# Patient Record
Sex: FEMALE | Race: WHITE | NOT HISPANIC OR LATINO | Employment: OTHER | URBAN - METROPOLITAN AREA
[De-identification: names, ages, dates, MRNs, and addresses within clinical notes are randomized per-mention and may not be internally consistent; named-entity substitution may affect disease eponyms.]

---

## 2018-09-05 ENCOUNTER — OFFICE VISIT CONVERTED (OUTPATIENT)
Dept: NEUROSURGERY | Facility: CLINIC | Age: 58
End: 2018-09-05
Attending: PHYSICIAN ASSISTANT

## 2018-09-05 ENCOUNTER — CONVERSION ENCOUNTER (OUTPATIENT)
Dept: NEUROLOGY | Facility: CLINIC | Age: 58
End: 2018-09-05

## 2018-09-11 ENCOUNTER — OFFICE VISIT CONVERTED (OUTPATIENT)
Dept: GASTROENTEROLOGY | Facility: CLINIC | Age: 58
End: 2018-09-11
Attending: PHYSICIAN ASSISTANT

## 2018-09-12 ENCOUNTER — OFFICE VISIT CONVERTED (OUTPATIENT)
Dept: NEUROSURGERY | Facility: CLINIC | Age: 58
End: 2018-09-12
Attending: NEUROLOGICAL SURGERY

## 2018-12-27 ENCOUNTER — CONVERSION ENCOUNTER (OUTPATIENT)
Dept: OTHER | Facility: HOSPITAL | Age: 58
End: 2018-12-27

## 2020-07-16 ENCOUNTER — OFFICE VISIT CONVERTED (OUTPATIENT)
Dept: ORTHOPEDIC SURGERY | Facility: CLINIC | Age: 60
End: 2020-07-16
Attending: ORTHOPAEDIC SURGERY

## 2020-07-31 ENCOUNTER — HOSPITAL ENCOUNTER (OUTPATIENT)
Dept: PREADMISSION TESTING | Facility: HOSPITAL | Age: 60
Discharge: HOME | End: 2020-07-31
Attending: ORTHOPAEDIC SURGERY

## 2020-08-02 LAB — SARS-COV-2 RNA SPEC QL NAA+PROBE: NOT DETECTED

## 2020-08-05 ENCOUNTER — HOSPITAL ENCOUNTER (OUTPATIENT)
Dept: PERIOP | Facility: HOSPITAL | Age: 60
Setting detail: HOSPITAL OUTPATIENT SURGERY
Discharge: HOME | End: 2020-08-05
Attending: ORTHOPAEDIC SURGERY

## 2020-08-05 LAB
GLUCOSE BLD-MCNC: 154 MG/DL (ref 65–99)
GLUCOSE BLD-MCNC: 177 MG/DL (ref 65–99)

## 2020-08-20 ENCOUNTER — OFFICE VISIT CONVERTED (OUTPATIENT)
Dept: ORTHOPEDIC SURGERY | Facility: CLINIC | Age: 60
End: 2020-08-20
Attending: PHYSICIAN ASSISTANT

## 2020-09-17 ENCOUNTER — OFFICE VISIT CONVERTED (OUTPATIENT)
Dept: ORTHOPEDIC SURGERY | Facility: CLINIC | Age: 60
End: 2020-09-17
Attending: PHYSICIAN ASSISTANT

## 2021-01-14 ENCOUNTER — HOSPITAL ENCOUNTER (OUTPATIENT)
Dept: GASTROENTEROLOGY | Facility: HOSPITAL | Age: 61
Discharge: HOME | End: 2021-01-14
Attending: FAMILY MEDICINE

## 2021-05-10 NOTE — H&P
History and Physical      Patient Name: Maggie Guzmán   Patient ID: [id number]   Sex: Female   YOB: 1960    Primary Care Provider: Kemar Marti MD   Referring Provider: Kemar Marti MD    Visit Date: July 16, 2020    Provider: Briseida Higginbotham MD   Location: [facility]   Location Address: [address] [place] [address]  [id number]   Location Phone: [phone]          Chief Complaint  · Right Elbow Pain & Numbness.      History Of Present Illness  Maggie Guzmán is a 59 year old /White female who presents today for right elbow numbness that comes down to the 4th and 5th phalanx. Patient states it has been increasingly getting worse over the last month. Patient has tried bracing and conservative treatment, which has failed. She has had an EMG/NCV study at [facility], which is positive for cubital tunnel.       Past Medical History  Anxiety; Arthritis; Degenerative Disc Disease ; Diabetes; Diabetes mellitus, insulin dependent (IDDM), controlled; Leg pain; Lumbago/low back pain; Radiculopathy, lumbosacral; Seasonal allergies; Seizures; Thyroid disorder; Ulcer         Past Surgical History  Appendectomy; Carpal Tunnel Release; Cholecstectomy; Cholecystecomy; Colonoscopy; Cyst Removal; EGD; Gallbladder; Hand surgery; Hernia; Hysterectomy; Joint Surgery; Tonsillectomy; Tubal_Ligation; Wrist Surgery         Medication List  aspirin 81 mg Oral Tablet, Chewable; Humalog 100 unit/mL subcutaneous cartridge; Lipitor 10 mg oral tablet; Protonix 40 mg oral tablet,delayed release (DR/EC)         Allergy List  Codeine Phosphate; Codeine Sulfate; Latex; Latex Exam Gloves; PENICILLINS         Family Medical History  Dementia, Senile; Uterus Neoplasm, Malignant; Stroke; Heart Disease; Cancer, Unspecified; Colon Neoplasm; Hypertension; Multiple sclerosis; Family history of Arthritis; Family history of diabetes mellitus         Social History  Alcohol (Never); Alcohol Use (Never); ; Homemaker; lives  "with children; lives with other; Recreational Drug Use (Never); Single.; Tobacco (Former)         Review of Systems  · Constitutional  o Denies  o : fever, chills, weight loss  · Cardiovascular  o Denies  o : chest pain, shortness of breath  · Gastrointestinal  o Denies  o : liver disease, heartburn, nausea, blood in stools  · Genitourinary  o Denies  o : painful urination, blood in urine  · Integument  o Denies  o : rash, itching  · Neurologic  o Admits  o : elbow numbness  o Denies  o : headache, weakness, loss of consciousness  · Musculoskeletal  o Admits  o : painful, swollen joints  · Psychiatric  o Denies  o : drug/alcohol addiction, anxiety, depression      Vitals  Date Time BP Position Site L\R Cuff Size HR RR TEMP (F) WT  HT  BMI kg/m2 BSA m2 O2 Sat        07/16/2020 02:57 PM      86 - R   218lbs 0oz 5'  7\" 34.14 2.16 96 %          Physical Examination  · Constitutional  o Appearance  o : well developed, well-nourished, no obvious deformities present  · Head and Face  o Head  o :   § Inspection  § : normocephalic  o Face  o :   § Inspection  § : no facial lesions  · Eyes  o Conjunctivae  o : conjunctivae normal  o Sclerae  o : sclerae white  · Ears, Nose, Mouth and Throat  o Ears  o :   § External Ears  § : appearance within normal limits  § Hearing  § : intact  o Nose  o :   § External Nose  § : appearance normal  · Neck  o Inspection/Palpation  o : normal appearance  o Range of Motion  o : full range of motion  · Respiratory  o Respiratory Effort  o : breathing unlabored  o Inspection of Chest  o : normal appearance  o Auscultation of Lungs  o : no audible wheezing or rales  · Cardiovascular  o Heart  o : regular rate  · Gastrointestinal  o Abdominal Examination  o : soft and non-tender  · Skin and Subcutaneous Tissue  o General Inspection  o : intact, no rashes  · Psychiatric  o General  o : Alert and oriented x3  o Judgement and Insight  o : judgment and insight intact  o Mood and Affect  o : mood " normal, affect appropriate  · Right Elbow  o Inspection  o : Mild swelling. No skin discoloration or atrophy. Palpable tenderness over cubital tunnel. Positive Tinel's. 2+ radial and ulnar pulses.   · Imaging  o Imaging  o : EMG/NCV performed at [facility] on 07/02/2020. NCV revealed decreased SNAP amplitude of ulnar sensory response to digit 5 and Decreased ulnar motor conduction velocity across the region of the elbow on the right. EMG was a normal study. These findings are suggestive of a compromise of the right ulnar nerve across the region of the elbow. There is evidence of focal demyelination of the ulnar nerve across the region. Both motor and sensory components of ulnar nerve are involved. No electrophysiologic evidence to suggest a medial or radial nerve injury on the right at this time.           Assessment  · Right elbow pain     719.42/M25.521  · Cubital tunnel syndrome on right     354.2/G56.21      Plan  · Medications  o Medications have been Reconciled  o Transition of Care or Provider Policy  · Instructions  o Reviewed the patient's Past Medical, Social, and Family history as well as the ROS at today's visit, no changes.  o Call or return if worsening symptoms.  o Discussed surgery.  o We discussed the risks and benefits of proceeding with a right cubital tunnel release. Risks and benefits discussed with the patient include, but are not limited to, infection, bleeding, death, blood clots, lung problems, heart attacks, possible recurrence, possible damage to neurovascular structures, aesthetic deformity, and possible need for future surgeries, among others. Patient understands the risks and benefits, and wishes to proceed. Patient will follow up in the clinic postoperatively.   o Surgery pamphlet given.  o This note was transcribed by Ciara ford/bipin.            Electronically Signed by: Ciara Richey-, Other -Author on July 18, 2020 01:20:33 PM  Electronically Co-signed by:  YANIV Galvan-MISSY -Reviewer on July 20, 2020 07:47:46 AM  Electronically Co-signed by: Briseida Higginbotham MD -Reviewer on July 21, 2020 08:27:04 AM

## 2021-05-13 NOTE — PROGRESS NOTES
Progress Note      Patient Name: Maggie Guzmán   Patient ID: [id number]   Sex: Female   YOB: 1960    Primary Care Provider: Kemar Marti MD   Referring Provider: Kemar Marti MD    Visit Date: September 17, 2020    Provider: Opal Moore PA-C   Location: [facility]   Location Address: [address] [place] [address]  [id number]   Location Phone: [phone]          Chief Complaint  · Follow up right cubital tunnel release      History Of Present Illness  Maggie Guzmán is a 59 year old /White female who presents today to [facility].      She is s/p right ulnar nerve transposition. She is now able to hold paint brush. She states numbness is resolved.       Past Medical History  Anxiety; Arthritis; Degenerative Disc Disease ; Diabetes; Diabetes mellitus, insulin dependent (IDDM), controlled; Leg pain; Lumbago/low back pain; Radiculopathy, lumbosacral; Seasonal allergies; Seizures; Thyroid disorder; Ulcer         Past Surgical History  Appendectomy; Carpal Tunnel Release; Cholecstectomy; Cholecystecomy; Colonoscopy; Cyst Removal; EGD; Gallbladder; Hand surgery; Hernia; Hysterectomy; Joint Surgery; Tonsillectomy; Tubal_Ligation; Wrist Surgery         Medication List  aspirin 81 mg Oral Tablet, Chewable; Humalog 100 unit/mL subcutaneous cartridge; Lipitor 10 mg oral tablet; Protonix 40 mg oral tablet,delayed release (DR/EC)         Allergy List  Codeine Phosphate; Codeine Sulfate; Latex; Latex Exam Gloves; PENICILLINS       Allergies Reconciled  Family Medical History  Dementia, Senile; Uterus Neoplasm, Malignant; Stroke; Heart Disease; Cancer, Unspecified; Colon Neoplasm; Hypertension; Multiple sclerosis; Family history of Arthritis; Family history of diabetes mellitus         Social History  Alcohol (Never); Alcohol Use (Never); ; Homemaker; lives with children; lives with other; Recreational Drug Use (Never); Single.; Tobacco (Former)         Review of  "Systems  · Constitutional  o Denies  o : fever, chills, weight loss  · Cardiovascular  o Denies  o : chest pain, shortness of breath  · Gastrointestinal  o Denies  o : liver disease, heartburn, nausea, blood in stools  · Genitourinary  o Denies  o : painful urination, blood in urine  · Integument  o Denies  o : rash, itching  · Neurologic  o Denies  o : headache, weakness, loss of consciousness  · Musculoskeletal  o Admits  o : painful, swollen joints  · Psychiatric  o Denies  o : drug/alcohol addiction, anxiety, depression      Vitals  Date Time BP Position Site L\R Cuff Size HR RR TEMP (F) WT  HT  BMI kg/m2 BSA m2 O2 Sat        09/17/2020 10:53 AM      67 - R   221lbs 5oz 5'  7\" 34.66 2.18 95 %          Physical Examination  · Constitutional  o Appearance  o : well developed, well-nourished, no obvious deformities present  · Head and Face  o Head  o :   § Inspection  § : normocephalic  o Face  o :   § Inspection  § : no facial lesions  · Eyes  o Conjunctivae  o : conjunctivae normal  o Sclerae  o : sclerae white  · Ears, Nose, Mouth and Throat  o Ears  o :   § External Ears  § : appearance within normal limits  § Hearing  § : intact  o Nose  o :   § External Nose  § : appearance normal  · Neck  o Inspection/Palpation  o : normal appearance  o Range of Motion  o : full range of motion  · Respiratory  o Respiratory Effort  o : breathing unlabored  o Inspection of Chest  o : normal appearance  o Auscultation of Lungs  o : no audible wheezing or rales  · Cardiovascular  o Heart  o : regular rate  · Gastrointestinal  o Abdominal Examination  o : soft and non-tender  · Skin and Subcutaneous Tissue  o General Inspection  o : intact, no rashes  · Psychiatric  o General  o : Alert and oriented x3  o Judgement and Insight  o : judgment and insight intact  o Mood and Affect  o : mood normal, affect appropriate  · Right Elbow  o Inspection  o : Well healed incision. Full ROM. Neurovascularly intact. "           Assessment  · Right Aftercare following surgery of the muskuloskeletal system     V54.81      Plan  · Instructions  o Reviewed the patient's Past Medical, Social, and Family history as well as the ROS at today's visit, no changes.  o Call or return if worsening symptoms.  o Follow Up PRN.            Electronically Signed by: YANIV Friedman-C -Author on September 17, 2020 11:42:10 AM  Electronically Co-signed by: Briseida Higginbotham MD -Reviewer on September 18, 2020 08:58:00 PM

## 2021-05-13 NOTE — PROGRESS NOTES
Progress Note      Patient Name: Maggie Guzmán   Patient ID: [id number]   Sex: Female   YOB: 1960    Primary Care Provider: Kemar Marti MD   Referring Provider: Kemar Marti MD    Visit Date: August 20, 2020    Provider: Opal Moore PA-C   Location: [facility]   Location Address: [address] [place] [address]  [id number]   Location Phone: [phone]          Chief Complaint  · Surgery follow up right cubital tunnel release      History Of Present Illness  Maggie Guzmán is a 59 year old /White female who presents today to [facility].      She is s/p right ulnar nerve transposition 8/5/20 by Dr. Higginbotham. She is doing well. She denies fever/chills. She states numbness is improved in 4/5th digits, but states her 4/5th fingers are drawing up.       Past Medical History  Anxiety; Arthritis; Degenerative Disc Disease ; Diabetes; Diabetes mellitus, insulin dependent (IDDM), controlled; Leg pain; Lumbago/low back pain; Radiculopathy, lumbosacral; Seasonal allergies; Seizures; Thyroid disorder; Ulcer         Past Surgical History  Appendectomy; Carpal Tunnel Release; Cholecstectomy; Cholecystecomy; Colonoscopy; Cyst Removal; EGD; Gallbladder; Hand surgery; Hernia; Hysterectomy; Joint Surgery; Tonsillectomy; Tubal_Ligation; Wrist Surgery         Medication List  aspirin 81 mg Oral Tablet, Chewable; Humalog 100 unit/mL subcutaneous cartridge; Lipitor 10 mg oral tablet; Protonix 40 mg oral tablet,delayed release (DR/EC)         Allergy List  Codeine Phosphate; Codeine Sulfate; Latex; Latex Exam Gloves; PENICILLINS       Allergies Reconciled  Family Medical History  Dementia, Senile; Uterus Neoplasm, Malignant; Stroke; Heart Disease; Cancer, Unspecified; Colon Neoplasm; Hypertension; Multiple sclerosis; Family history of Arthritis; Family history of diabetes mellitus         Social History  Alcohol (Never); Alcohol Use (Never); ; Homemaker; lives with children;  "lives with other; Recreational Drug Use (Never); Single.; Tobacco (Former)         Review of Systems  · Constitutional  o Denies  o : fever, chills, weight loss  · Cardiovascular  o Denies  o : chest pain, shortness of breath  · Gastrointestinal  o Denies  o : liver disease, heartburn, nausea, blood in stools  · Genitourinary  o Denies  o : painful urination, blood in urine  · Integument  o Denies  o : rash, itching  · Neurologic  o Denies  o : headache, weakness, loss of consciousness  · Musculoskeletal  o Admits  o : painful, swollen joints  · Psychiatric  o Denies  o : drug/alcohol addiction, anxiety, depression      Vitals  Date Time BP Position Site L\R Cuff Size HR RR TEMP (F) WT  HT  BMI kg/m2 BSA m2 O2 Sat        08/20/2020 10:37 AM      77 - R   218lbs 0oz 5'  7\" 34.14 2.16 98 %          Physical Examination  · Constitutional  o Appearance  o : well developed, well-nourished, no obvious deformities present  · Head and Face  o Head  o :   § Inspection  § : normocephalic  o Face  o :   § Inspection  § : no facial lesions  · Eyes  o Conjunctivae  o : conjunctivae normal  o Sclerae  o : sclerae white  · Ears, Nose, Mouth and Throat  o Ears  o :   § External Ears  § : appearance within normal limits  § Hearing  § : intact  o Nose  o :   § External Nose  § : appearance normal  · Neck  o Inspection/Palpation  o : normal appearance  o Range of Motion  o : full range of motion  · Respiratory  o Respiratory Effort  o : breathing unlabored  o Inspection of Chest  o : normal appearance  o Auscultation of Lungs  o : no audible wheezing or rales  · Cardiovascular  o Heart  o : regular rate  · Gastrointestinal  o Abdominal Examination  o : soft and non-tender  · Skin and Subcutaneous Tissue  o General Inspection  o : intact, no rashes  · Psychiatric  o General  o : Alert and oriented x3  o Judgement and Insight  o : judgment and insight intact  o Mood and Affect  o : mood normal, affect appropriate  · Right " Elbow  o Inspection  o : Well approximated incision. No signs of infection. Full ROM. Has full ROM of 4th/5th digits. Sensation grossly intact. All compartments soft and well perfused.           Assessment  · Right Aftercare following surgery of the muskuloskeletal system     V54.81      Plan  · Medications  o Medications have been Reconciled  o Transition of Care or Provider Policy  · Instructions  o Reviewed the patient's Past Medical, Social, and Family history as well as the ROS at today's visit, no changes.  o Call or return if worsening symptoms.  o Start hand therapy. Restrictions discussed. Follow up 4 weeks.   o Electronically Identified Patient Education Materials Provided Electronically            Electronically Signed by: YANIV Friedman-C -Author on August 20, 2020 11:36:23 AM  Electronically Co-signed by: Briseida Higginbotham MD -Reviewer on August 20, 2020 02:15:57 PM

## 2021-05-14 VITALS — HEART RATE: 67 BPM | OXYGEN SATURATION: 95 % | WEIGHT: 221.31 LBS | BODY MASS INDEX: 34.73 KG/M2 | HEIGHT: 67 IN

## 2021-05-15 VITALS — HEIGHT: 67 IN | BODY MASS INDEX: 34.21 KG/M2 | OXYGEN SATURATION: 98 % | HEART RATE: 77 BPM | WEIGHT: 218 LBS

## 2021-05-15 VITALS — WEIGHT: 218 LBS | OXYGEN SATURATION: 96 % | HEART RATE: 86 BPM | BODY MASS INDEX: 34.21 KG/M2 | HEIGHT: 67 IN

## 2021-05-16 VITALS — HEIGHT: 67 IN | BODY MASS INDEX: 30.29 KG/M2 | HEART RATE: 83 BPM | WEIGHT: 193 LBS

## 2021-05-16 VITALS
WEIGHT: 193 LBS | SYSTOLIC BLOOD PRESSURE: 122 MMHG | RESPIRATION RATE: 16 BRPM | HEART RATE: 88 BPM | OXYGEN SATURATION: 99 % | DIASTOLIC BLOOD PRESSURE: 74 MMHG

## 2021-05-16 VITALS
BODY MASS INDEX: 30.29 KG/M2 | HEIGHT: 67 IN | DIASTOLIC BLOOD PRESSURE: 71 MMHG | SYSTOLIC BLOOD PRESSURE: 139 MMHG | WEIGHT: 193 LBS

## 2023-01-23 ENCOUNTER — TRANSCRIBE ORDERS (OUTPATIENT)
Dept: ADMINISTRATIVE | Facility: HOSPITAL | Age: 63
End: 2023-01-23
Payer: MEDICARE

## 2023-01-23 DIAGNOSIS — Z12.31 SCREENING MAMMOGRAM FOR HIGH-RISK PATIENT: Primary | ICD-10-CM

## 2023-04-13 ENCOUNTER — HOSPITAL ENCOUNTER (OUTPATIENT)
Dept: MAMMOGRAPHY | Facility: HOSPITAL | Age: 63
Discharge: HOME | End: 2023-04-13
Admitting: NURSE PRACTITIONER
Payer: MEDICARE

## 2023-04-13 DIAGNOSIS — Z12.31 SCREENING MAMMOGRAM FOR HIGH-RISK PATIENT: ICD-10-CM

## 2023-04-13 PROCEDURE — 77067 SCR MAMMO BI INCL CAD: CPT

## 2023-04-13 PROCEDURE — 77063 BREAST TOMOSYNTHESIS BI: CPT

## 2023-05-15 ENCOUNTER — TRANSCRIBE ORDERS (OUTPATIENT)
Dept: ADMINISTRATIVE | Facility: HOSPITAL | Age: 63
End: 2023-05-15
Payer: MEDICARE

## 2023-05-15 DIAGNOSIS — H61.303 ACQUIRED STENOSIS OF BOTH EXTERNAL EAR CANALS: Primary | ICD-10-CM

## 2023-05-24 ENCOUNTER — HOSPITAL ENCOUNTER (OUTPATIENT)
Dept: CT IMAGING | Facility: HOSPITAL | Age: 63
Discharge: HOME | End: 2023-05-24
Admitting: OTOLARYNGOLOGY
Payer: MEDICARE

## 2023-05-24 DIAGNOSIS — H61.303 ACQUIRED STENOSIS OF BOTH EXTERNAL EAR CANALS: ICD-10-CM

## 2023-05-24 PROCEDURE — 70480 CT ORBIT/EAR/FOSSA W/O DYE: CPT

## 2023-09-15 ENCOUNTER — HOSPITAL ENCOUNTER (EMERGENCY)
Facility: HOSPITAL | Age: 63
Discharge: HOME | End: 2023-09-15
Attending: EMERGENCY MEDICINE
Payer: MEDICARE

## 2023-09-15 ENCOUNTER — APPOINTMENT (OUTPATIENT)
Dept: CT IMAGING | Facility: HOSPITAL | Age: 63
End: 2023-09-15
Payer: MEDICARE

## 2023-09-15 VITALS
TEMPERATURE: 98.5 F | OXYGEN SATURATION: 99 % | HEIGHT: 67 IN | WEIGHT: 209.44 LBS | RESPIRATION RATE: 18 BRPM | BODY MASS INDEX: 32.87 KG/M2 | SYSTOLIC BLOOD PRESSURE: 161 MMHG | DIASTOLIC BLOOD PRESSURE: 81 MMHG | HEART RATE: 80 BPM

## 2023-09-15 DIAGNOSIS — K43.9 VENTRAL HERNIA WITHOUT OBSTRUCTION OR GANGRENE: ICD-10-CM

## 2023-09-15 DIAGNOSIS — K57.90 DIVERTICULOSIS: Primary | ICD-10-CM

## 2023-09-15 DIAGNOSIS — R10.9 LEFT SIDED ABDOMINAL PAIN: ICD-10-CM

## 2023-09-15 LAB
ACETONE BLD QL: NEGATIVE
ALBUMIN SERPL-MCNC: 4.7 G/DL (ref 3.5–5.2)
ALBUMIN/GLOB SERPL: 1.6 G/DL
ALP SERPL-CCNC: 116 U/L (ref 39–117)
ALT SERPL W P-5'-P-CCNC: 30 U/L (ref 1–33)
ANION GAP SERPL CALCULATED.3IONS-SCNC: 11.8 MMOL/L (ref 5–15)
AST SERPL-CCNC: 38 U/L (ref 1–32)
BASOPHILS # BLD AUTO: 0.11 10*3/MM3 (ref 0–0.2)
BASOPHILS NFR BLD AUTO: 0.8 % (ref 0–1.5)
BILIRUB SERPL-MCNC: 0.3 MG/DL (ref 0–1.2)
BILIRUB UR QL STRIP: NEGATIVE
BUN SERPL-MCNC: 10 MG/DL (ref 8–23)
BUN/CREAT SERPL: 11.4 (ref 7–25)
CALCIUM SPEC-SCNC: 10.2 MG/DL (ref 8.6–10.5)
CHLORIDE SERPL-SCNC: 103 MMOL/L (ref 98–107)
CLARITY UR: CLEAR
CO2 SERPL-SCNC: 25.2 MMOL/L (ref 22–29)
COLOR UR: YELLOW
CREAT SERPL-MCNC: 0.88 MG/DL (ref 0.57–1)
D-LACTATE SERPL-SCNC: 1.4 MMOL/L (ref 0.5–2)
DEPRECATED RDW RBC AUTO: 44.4 FL (ref 37–54)
EGFRCR SERPLBLD CKD-EPI 2021: 73.5 ML/MIN/1.73
EOSINOPHIL # BLD AUTO: 0.19 10*3/MM3 (ref 0–0.4)
EOSINOPHIL NFR BLD AUTO: 1.3 % (ref 0.3–6.2)
ERYTHROCYTE [DISTWIDTH] IN BLOOD BY AUTOMATED COUNT: 13.2 % (ref 12.3–15.4)
GLOBULIN UR ELPH-MCNC: 3 GM/DL
GLUCOSE BLDC GLUCOMTR-MCNC: 57 MG/DL (ref 70–99)
GLUCOSE BLDC GLUCOMTR-MCNC: 83 MG/DL (ref 70–99)
GLUCOSE SERPL-MCNC: 117 MG/DL (ref 65–99)
GLUCOSE UR STRIP-MCNC: NEGATIVE MG/DL
HCT VFR BLD AUTO: 43.8 % (ref 34–46.6)
HGB BLD-MCNC: 14.9 G/DL (ref 12–15.9)
HGB UR QL STRIP.AUTO: NEGATIVE
HOLD SPECIMEN: NORMAL
HOLD SPECIMEN: NORMAL
IMM GRANULOCYTES # BLD AUTO: 0.07 10*3/MM3 (ref 0–0.05)
IMM GRANULOCYTES NFR BLD AUTO: 0.5 % (ref 0–0.5)
KETONES UR QL STRIP: NEGATIVE
LEUKOCYTE ESTERASE UR QL STRIP.AUTO: NEGATIVE
LIPASE SERPL-CCNC: 84 U/L (ref 13–60)
LYMPHOCYTES # BLD AUTO: 2.64 10*3/MM3 (ref 0.7–3.1)
LYMPHOCYTES NFR BLD AUTO: 18.2 % (ref 19.6–45.3)
MCH RBC QN AUTO: 31.4 PG (ref 26.6–33)
MCHC RBC AUTO-ENTMCNC: 34 G/DL (ref 31.5–35.7)
MCV RBC AUTO: 92.2 FL (ref 79–97)
MONOCYTES # BLD AUTO: 0.88 10*3/MM3 (ref 0.1–0.9)
MONOCYTES NFR BLD AUTO: 6.1 % (ref 5–12)
NEUTROPHILS NFR BLD AUTO: 10.6 10*3/MM3 (ref 1.7–7)
NEUTROPHILS NFR BLD AUTO: 73.1 % (ref 42.7–76)
NITRITE UR QL STRIP: NEGATIVE
NRBC BLD AUTO-RTO: 0 /100 WBC (ref 0–0.2)
PH UR STRIP.AUTO: 5.5 [PH] (ref 5–8)
PLATELET # BLD AUTO: 450 10*3/MM3 (ref 140–450)
PMV BLD AUTO: 9.5 FL (ref 6–12)
POTASSIUM SERPL-SCNC: 3.8 MMOL/L (ref 3.5–5.2)
PROT SERPL-MCNC: 7.7 G/DL (ref 6–8.5)
PROT UR QL STRIP: NEGATIVE
RBC # BLD AUTO: 4.75 10*6/MM3 (ref 3.77–5.28)
SODIUM SERPL-SCNC: 140 MMOL/L (ref 136–145)
SP GR UR STRIP: 1.01 (ref 1–1.03)
UROBILINOGEN UR QL STRIP: NORMAL
WBC NRBC COR # BLD: 14.49 10*3/MM3 (ref 3.4–10.8)
WHOLE BLOOD HOLD COAG: NORMAL
WHOLE BLOOD HOLD SPECIMEN: NORMAL

## 2023-09-15 PROCEDURE — 99285 EMERGENCY DEPT VISIT HI MDM: CPT

## 2023-09-15 PROCEDURE — 83605 ASSAY OF LACTIC ACID: CPT | Performed by: EMERGENCY MEDICINE

## 2023-09-15 PROCEDURE — 96361 HYDRATE IV INFUSION ADD-ON: CPT

## 2023-09-15 PROCEDURE — 96375 TX/PRO/DX INJ NEW DRUG ADDON: CPT

## 2023-09-15 PROCEDURE — 81003 URINALYSIS AUTO W/O SCOPE: CPT | Performed by: EMERGENCY MEDICINE

## 2023-09-15 PROCEDURE — 82948 REAGENT STRIP/BLOOD GLUCOSE: CPT

## 2023-09-15 PROCEDURE — 80053 COMPREHEN METABOLIC PANEL: CPT | Performed by: EMERGENCY MEDICINE

## 2023-09-15 PROCEDURE — 96374 THER/PROPH/DIAG INJ IV PUSH: CPT

## 2023-09-15 PROCEDURE — 74177 CT ABD & PELVIS W/CONTRAST: CPT

## 2023-09-15 PROCEDURE — 25510000001 IOPAMIDOL PER 1 ML: Performed by: EMERGENCY MEDICINE

## 2023-09-15 PROCEDURE — 83690 ASSAY OF LIPASE: CPT | Performed by: EMERGENCY MEDICINE

## 2023-09-15 PROCEDURE — 25010000002 KETOROLAC TROMETHAMINE PER 15 MG

## 2023-09-15 PROCEDURE — 25010000002 ONDANSETRON PER 1 MG

## 2023-09-15 PROCEDURE — 85025 COMPLETE CBC W/AUTO DIFF WBC: CPT | Performed by: EMERGENCY MEDICINE

## 2023-09-15 PROCEDURE — 82009 KETONE BODYS QUAL: CPT | Performed by: EMERGENCY MEDICINE

## 2023-09-15 RX ORDER — LEVOTHYROXINE SODIUM 0.1 MG/1
100 TABLET ORAL DAILY
COMMUNITY
Start: 2023-09-01

## 2023-09-15 RX ORDER — ATORVASTATIN CALCIUM 80 MG/1
80 TABLET, FILM COATED ORAL DAILY
COMMUNITY
Start: 2023-09-01

## 2023-09-15 RX ORDER — CIPROFLOXACIN 500 MG/1
500 TABLET, FILM COATED ORAL 2 TIMES DAILY
Qty: 10 TABLET | Refills: 0 | Status: SHIPPED | OUTPATIENT
Start: 2023-09-15 | End: 2023-09-20

## 2023-09-15 RX ORDER — ONDANSETRON 4 MG/1
4 TABLET, ORALLY DISINTEGRATING ORAL 4 TIMES DAILY PRN
Qty: 20 TABLET | Refills: 0 | Status: SHIPPED | OUTPATIENT
Start: 2023-09-15

## 2023-09-15 RX ORDER — BLOOD-GLUCOSE METER
KIT MISCELLANEOUS
COMMUNITY
Start: 2023-07-20

## 2023-09-15 RX ORDER — GLUCAGON HYDROCHLORIDE 1 MG
KIT INJECTION
COMMUNITY
Start: 2023-07-14

## 2023-09-15 RX ORDER — BLOOD SUGAR DIAGNOSTIC
STRIP MISCELLANEOUS
COMMUNITY
Start: 2023-08-26

## 2023-09-15 RX ORDER — ONDANSETRON 2 MG/ML
4 INJECTION INTRAMUSCULAR; INTRAVENOUS ONCE
Status: COMPLETED | OUTPATIENT
Start: 2023-09-15 | End: 2023-09-15

## 2023-09-15 RX ORDER — SODIUM CHLORIDE 0.9 % (FLUSH) 0.9 %
10 SYRINGE (ML) INJECTION AS NEEDED
Status: DISCONTINUED | OUTPATIENT
Start: 2023-09-15 | End: 2023-09-15 | Stop reason: HOSPADM

## 2023-09-15 RX ORDER — LANCETS 33 GAUGE
EACH MISCELLANEOUS
COMMUNITY
Start: 2023-08-26

## 2023-09-15 RX ORDER — METRONIDAZOLE 500 MG/1
500 TABLET ORAL 2 TIMES DAILY
Qty: 10 TABLET | Refills: 0 | Status: SHIPPED | OUTPATIENT
Start: 2023-09-15 | End: 2023-09-20

## 2023-09-15 RX ORDER — INSULIN DETEMIR 100 [IU]/ML
INJECTION, SOLUTION SUBCUTANEOUS
COMMUNITY
Start: 2023-09-01

## 2023-09-15 RX ORDER — INSULIN LISPRO 100 [IU]/ML
INJECTION, SOLUTION INTRAVENOUS; SUBCUTANEOUS
COMMUNITY

## 2023-09-15 RX ORDER — KETOROLAC TROMETHAMINE 30 MG/ML
15 INJECTION, SOLUTION INTRAMUSCULAR; INTRAVENOUS ONCE
Status: COMPLETED | OUTPATIENT
Start: 2023-09-15 | End: 2023-09-15

## 2023-09-15 RX ADMIN — IOPAMIDOL 100 ML: 755 INJECTION, SOLUTION INTRAVENOUS at 13:57

## 2023-09-15 RX ADMIN — ONDANSETRON 4 MG: 2 INJECTION INTRAMUSCULAR; INTRAVENOUS at 13:35

## 2023-09-15 RX ADMIN — SODIUM CHLORIDE 1000 ML: 9 INJECTION, SOLUTION INTRAVENOUS at 13:34

## 2023-09-15 RX ADMIN — KETOROLAC TROMETHAMINE 15 MG: 30 INJECTION, SOLUTION INTRAMUSCULAR; INTRAVENOUS at 13:35

## 2023-09-15 NOTE — ED PROVIDER NOTES
Time: 12:06 PM EDT  Date of encounter:  9/15/2023  Independent Historian/Clinical History and Information was obtained by:   Patient    History is limited by: N/A    Chief Complaint: abdominal pain       History of Present Illness:  Patient is a 62 y.o. year old female who presents to the emergency department for evaluation of abdominal pain with a palpable nodule just to the left of her bellybutton.  Patient states she has felt a knot for 2 to 3 weeks but it has not been painful.  Yesterday she started developing pain.  When she is up walking and ambulating her pain is a 9 however when she lays down it does decrease.  She has also had nausea with diarrhea yesterday.  The diarrhea has resolved and today she reports having a normal bowel movement.  Her primary care provider referred her to the emergency department for imaging.  PT Was able to drive herself to the facility.    HPI    Patient Care Team  Primary Care Provider: Maxine Trammell MD    Past Medical History:     Allergies   Allergen Reactions    Codeine Unknown - High Severity and Other (See Comments)    Penicillins Unknown - High Severity and Rash     Past Medical History:   Diagnosis Date    Diabetes mellitus      Past Surgical History:   Procedure Laterality Date    ADENOIDECTOMY      TONSILLECTOMY       Family History   Problem Relation Age of Onset    Breast cancer Maternal Grandmother        Home Medications:  Prior to Admission medications    Not on File        Social History:   Social History     Tobacco Use    Smoking status: Never    Smokeless tobacco: Never   Vaping Use    Vaping Use: Never used   Substance Use Topics    Alcohol use: Never    Drug use: Never         Review of Systems:  Review of Systems   Constitutional:  Negative for chills and fever.   HENT:  Negative for congestion, ear pain and sore throat.    Eyes:  Negative for pain.   Respiratory:  Negative for cough, chest tightness and shortness of breath.    Cardiovascular:  Negative for  "chest pain.   Gastrointestinal:  Positive for abdominal pain and nausea. Negative for abdominal distention, constipation, diarrhea and vomiting.   Genitourinary:  Negative for flank pain and hematuria.   Musculoskeletal:  Negative for joint swelling.   Skin:  Negative for pallor.   Neurological:  Negative for seizures and headaches.   All other systems reviewed and are negative.     Physical Exam:  /81   Pulse 80   Temp 98.5 °F (36.9 °C) (Oral)   Resp 18   Ht 170.2 cm (67\")   Wt 95 kg (209 lb 7 oz)   SpO2 99%   BMI 32.80 kg/m²     Physical Exam  Vitals and nursing note reviewed.   Constitutional:       General: She is not in acute distress.     Appearance: Normal appearance. She is not ill-appearing, toxic-appearing or diaphoretic.   HENT:      Head: Normocephalic and atraumatic.      Mouth/Throat:      Mouth: Mucous membranes are moist.   Eyes:      General: No scleral icterus.  Cardiovascular:      Rate and Rhythm: Normal rate and regular rhythm.      Pulses: Normal pulses.      Heart sounds: Normal heart sounds.   Pulmonary:      Effort: Pulmonary effort is normal. No respiratory distress.      Breath sounds: Normal breath sounds. No stridor. No wheezing.   Abdominal:      General: Abdomen is flat. There is no distension.      Palpations: Abdomen is soft.      Tenderness: There is no abdominal tenderness in the periumbilical area and left upper quadrant.      Hernia: A hernia is present. Hernia is present in the ventral area.   Musculoskeletal:         General: Normal range of motion.      Cervical back: Normal range of motion and neck supple.   Skin:     General: Skin is warm and dry.      Coloration: Skin is not cyanotic, jaundiced, mottled or pale.      Findings: No erythema or rash.   Neurological:      Mental Status: She is alert and oriented to person, place, and time. Mental status is at baseline.                Procedures:  Procedures      Medical Decision Making:      Comorbidities that " affect care:    Diabetes    External Notes reviewed:    Previous Clinic Note: 7/14/2023      The following orders were placed and all results were independently analyzed by me:  Orders Placed This Encounter   Procedures    CT Abdomen Pelvis With Contrast    Wykoff Draw    Comprehensive Metabolic Panel    Lipase    Urinalysis With Microscopic If Indicated (No Culture) - Urine, Clean Catch    Lactic Acid, Plasma    CBC Auto Differential    Acetone    Undress & Gown    POC Glucose Once    POC Glucose Once    CBC & Differential    Green Top (Gel)    Lavender Top    Gold Top - SST    Light Blue Top       Medications Given in the Emergency Department:  Medications   ondansetron (ZOFRAN) injection 4 mg (4 mg Intravenous Given 9/15/23 1335)   ketorolac (TORADOL) injection 15 mg (15 mg Intravenous Given 9/15/23 1335)   sodium chloride 0.9 % bolus 1,000 mL (0 mL Intravenous Stopped 9/15/23 1434)   iopamidol (ISOVUE-370) 76 % injection 100 mL (100 mL Intravenous Given 9/15/23 1357)        ED Course:    ED Course as of 09/16/23 1719   Fri Sep 15, 2023   1415 Pt alerted staff that her blood glucose was dropping. POC completed and blood glucose was 55. Oral glucose tablets given.  [MS]      ED Course User Index  [MS] Marielos Drew, RYANN       Labs:    Lab Results (last 24 hours)       ** No results found for the last 24 hours. **             Imaging:    No Radiology Exams Resulted Within Past 24 Hours      Differential Diagnosis and Discussion:    Abdominal Pain: Based on the patient's signs and symptoms, I considered abdominal aortic aneurysm, small bowel obstruction, pancreatitis, acute cholecystitis, acute appendecitis, peptic ulcer disease, gastritis, colitis, endocrine disorders, irritable bowel syndrome and other differential diagnosis an etiology of the patient's abdominal pain.    All labs were reviewed and interpreted by me.  CT scan radiology impression was interpreted by me.    MDM  Number of Diagnoses or  Management Options  Diverticulosis: new and does not require workup  Left sided abdominal pain: new and does not require workup  Ventral hernia without obstruction or gangrene: new and does not require workup     Amount and/or Complexity of Data Reviewed  Clinical lab tests: reviewed and ordered  Tests in the radiology section of CPT®: reviewed and ordered  Review and summarize past medical records: yes (I have personally reviewed patient's previous medical encounters.  )    Risk of Complications, Morbidity, and/or Mortality  Presenting problems: moderate  Diagnostic procedures: moderate  Management options: moderate    Patient Progress  Patient progress: stable           Patient Care Considerations:    STEROIDS: I considered prescribing steroids, however I did not as this may adversely affect the patient's blood sugar in the setting of diabetes.      Consultants/Shared Management Plan:    None    Social Determinants of Health:    Patient is independent, reliable, and has access to care.       Disposition and Care Coordination:    Discharged: The patient is suitable and stable for discharge with no need for consideration of observation or admission.    I have explained the patient´s condition, diagnoses and treatment plan based on the information available to me at this time. I have answered questions and addressed any concerns. The patient has a good  understanding of the patient´s diagnosis, condition, and treatment plan as can be expected at this point. The vital signs have been stable. The patient´s condition is stable and appropriate for discharge from the emergency department.      The patient will pursue further outpatient evaluation with the primary care physician or other designated or consulting physician as outlined in the discharge instructions. They are agreeable to this plan of care and follow-up instructions have been explained in detail. The patient has received these instructions in written format  and have expressed an understanding of the discharge instructions. The patient is aware that any significant change in condition or worsening of symptoms should prompt an immediate return to this or the closest emergency department or call to 911.    Final diagnoses:   Diverticulosis   Left sided abdominal pain   Ventral hernia without obstruction or gangrene        ED Disposition       ED Disposition   Discharge    Condition   Stable    Comment   --               This medical record created using voice recognition software.             Marielos Drew, APRN  09/16/23 4829

## 2023-09-15 NOTE — DISCHARGE INSTRUCTIONS
Your CAT scan completed in the emergency department showed that you had diverticulosis as well as a small hernia.  Your hernia did was not obstructing anything and you had no bowel obstruction.  You have been prescribed an antibiotic you will need to take all of it until it is gone.  If you continue to have the pain and discomfort, if you develop a fever that cannot be controlled with Tylenol or Motrin, or develop severe nausea, vomiting, or diarrhea please return to the ER otherwise follow-up with your primary care provider.

## 2024-03-26 ENCOUNTER — TRANSCRIBE ORDERS (OUTPATIENT)
Dept: ADMINISTRATIVE | Facility: HOSPITAL | Age: 64
End: 2024-03-26
Payer: MEDICARE

## 2024-03-26 DIAGNOSIS — Z12.31 SCREENING MAMMOGRAM FOR HIGH-RISK PATIENT: Primary | ICD-10-CM

## 2024-04-19 ENCOUNTER — HOSPITAL ENCOUNTER (OUTPATIENT)
Dept: MAMMOGRAPHY | Facility: HOSPITAL | Age: 64
Discharge: HOME | End: 2024-04-19
Admitting: FAMILY MEDICINE
Payer: MEDICARE

## 2024-04-19 DIAGNOSIS — Z12.31 SCREENING MAMMOGRAM FOR HIGH-RISK PATIENT: ICD-10-CM

## 2024-04-19 PROCEDURE — 77063 BREAST TOMOSYNTHESIS BI: CPT

## 2024-04-19 PROCEDURE — 77067 SCR MAMMO BI INCL CAD: CPT

## 2024-07-03 ENCOUNTER — CONSULT (OUTPATIENT)
Dept: ONCOLOGY | Facility: HOSPITAL | Age: 64
End: 2024-07-03
Payer: MEDICARE

## 2024-07-03 ENCOUNTER — LAB (OUTPATIENT)
Dept: ONCOLOGY | Facility: HOSPITAL | Age: 64
End: 2024-07-03
Payer: MEDICARE

## 2024-07-03 VITALS
SYSTOLIC BLOOD PRESSURE: 149 MMHG | HEART RATE: 69 BPM | BODY MASS INDEX: 33.36 KG/M2 | OXYGEN SATURATION: 100 % | RESPIRATION RATE: 20 BRPM | WEIGHT: 213 LBS | DIASTOLIC BLOOD PRESSURE: 65 MMHG | TEMPERATURE: 96.9 F

## 2024-07-03 DIAGNOSIS — D64.9 ANEMIA, UNSPECIFIED TYPE: Primary | ICD-10-CM

## 2024-07-03 DIAGNOSIS — D72.829 LEUKOCYTOSIS, UNSPECIFIED TYPE: Primary | ICD-10-CM

## 2024-07-03 LAB
ALBUMIN SERPL-MCNC: 4.1 G/DL (ref 3.5–5.2)
ALBUMIN/GLOB SERPL: 1.9 G/DL
ALP SERPL-CCNC: 83 U/L (ref 39–117)
ALT SERPL W P-5'-P-CCNC: 21 U/L (ref 1–33)
ANION GAP SERPL CALCULATED.3IONS-SCNC: 8.2 MMOL/L (ref 5–15)
ANISOCYTOSIS BLD QL: ABNORMAL
AST SERPL-CCNC: 22 U/L (ref 1–32)
BASOPHILS # BLD AUTO: 0.04 10*3/MM3 (ref 0–0.2)
BASOPHILS NFR BLD AUTO: 0.3 % (ref 0–1.5)
BILIRUB SERPL-MCNC: 0.4 MG/DL (ref 0–1.2)
BUN SERPL-MCNC: 11 MG/DL (ref 8–23)
BUN/CREAT SERPL: 11.5 (ref 7–25)
CALCIUM SPEC-SCNC: 9.5 MG/DL (ref 8.6–10.5)
CHLORIDE SERPL-SCNC: 103 MMOL/L (ref 98–107)
CO2 SERPL-SCNC: 25.8 MMOL/L (ref 22–29)
CREAT SERPL-MCNC: 0.96 MG/DL (ref 0.57–1)
DEPRECATED RDW RBC AUTO: 47.5 FL (ref 37–54)
EGFRCR SERPLBLD CKD-EPI 2021: 65.8 ML/MIN/1.73
EOSINOPHIL # BLD AUTO: 0.27 10*3/MM3 (ref 0–0.4)
EOSINOPHIL # BLD MANUAL: 0.12 10*3/MM3 (ref 0–0.4)
EOSINOPHIL NFR BLD AUTO: 2.3 % (ref 0.3–6.2)
EOSINOPHIL NFR BLD MANUAL: 1 % (ref 0.3–6.2)
ERYTHROCYTE [DISTWIDTH] IN BLOOD BY AUTOMATED COUNT: 13.3 % (ref 12.3–15.4)
GLOBULIN UR ELPH-MCNC: 2.2 GM/DL
GLUCOSE SERPL-MCNC: 320 MG/DL (ref 65–99)
HCT VFR BLD AUTO: 42.5 % (ref 34–46.6)
HGB BLD-MCNC: 13.6 G/DL (ref 12–15.9)
IMM GRANULOCYTES # BLD AUTO: 0.02 10*3/MM3 (ref 0–0.05)
IMM GRANULOCYTES NFR BLD AUTO: 0.2 % (ref 0–0.5)
LYMPHOCYTES # BLD AUTO: 1.65 10*3/MM3 (ref 0.7–3.1)
LYMPHOCYTES # BLD MANUAL: 2.09 10*3/MM3 (ref 0.7–3.1)
LYMPHOCYTES NFR BLD AUTO: 14.2 % (ref 19.6–45.3)
LYMPHOCYTES NFR BLD MANUAL: 4 % (ref 5–12)
MACROCYTES BLD QL SMEAR: ABNORMAL
MCH RBC QN AUTO: 30.5 PG (ref 26.6–33)
MCHC RBC AUTO-ENTMCNC: 32 G/DL (ref 31.5–35.7)
MCV RBC AUTO: 95.3 FL (ref 79–97)
MONOCYTES # BLD AUTO: 0.64 10*3/MM3 (ref 0.1–0.9)
MONOCYTES # BLD: 0.46 10*3/MM3 (ref 0.1–0.9)
MONOCYTES NFR BLD AUTO: 5.5 % (ref 5–12)
NEUTROPHILS # BLD AUTO: 8.94 10*3/MM3 (ref 1.7–7)
NEUTROPHILS NFR BLD AUTO: 77.5 % (ref 42.7–76)
NEUTROPHILS NFR BLD AUTO: 8.99 10*3/MM3 (ref 1.7–7)
NEUTROPHILS NFR BLD MANUAL: 77 % (ref 42.7–76)
PATHOLOGY REVIEW: YES
PLATELET # BLD AUTO: 342 10*3/MM3 (ref 140–450)
PMV BLD AUTO: 9.7 FL (ref 6–12)
POTASSIUM SERPL-SCNC: 4.5 MMOL/L (ref 3.5–5.2)
PROT SERPL-MCNC: 6.3 G/DL (ref 6–8.5)
RBC # BLD AUTO: 4.46 10*6/MM3 (ref 3.77–5.28)
SMALL PLATELETS BLD QL SMEAR: ADEQUATE
SODIUM SERPL-SCNC: 137 MMOL/L (ref 136–145)
VARIANT LYMPHS NFR BLD MANUAL: 18 % (ref 19.6–45.3)
WBC MORPH BLD: NORMAL
WBC NRBC COR # BLD AUTO: 11.61 10*3/MM3 (ref 3.4–10.8)

## 2024-07-03 PROCEDURE — 80053 COMPREHEN METABOLIC PANEL: CPT | Performed by: NURSE PRACTITIONER

## 2024-07-03 PROCEDURE — 85025 COMPLETE CBC W/AUTO DIFF WBC: CPT | Performed by: NURSE PRACTITIONER

## 2024-07-03 PROCEDURE — G0463 HOSPITAL OUTPT CLINIC VISIT: HCPCS | Performed by: NURSE PRACTITIONER

## 2024-07-03 PROCEDURE — 36415 COLL VENOUS BLD VENIPUNCTURE: CPT | Performed by: NURSE PRACTITIONER

## 2024-07-03 NOTE — PROGRESS NOTES
Chief Complaint/Reason for Referral:  Leukocytosis    Fanta Yeung*  Maxine Trammell MD    Records Obtained:  Records of the patients history including those obtained from  [facility] and patient information were reviewed and summarized in detail.    Subjective    History of Present Illness  Ms. Maggie Guzmán is a very pleasant 63 year old  female who presents to hematology for leukoctyosis from her PCP: RYANN Coats.     PMH includes: mild smoker who used to smoke less than a pack per week and recently quit smoking about 9 months ago, DM 2, receives steroid injections every 4 months for pain control, thyroid issues. Reports she has noticed more fatigue than usual but attributes this to caring for her grandchildren. Feels like the fatigue is improving. Reports she is up to date on mammogram screening and colonoscopy screening. Reports colonoscopy with good report and was told to follow up in 3 years. Denies any weight loss, but weight does go up and down, denies night sweats, denies any fevers, chills, cough, SOA or loose stools. Denies any family history of underlying cancers.     Lab work recently on 5/8/2024 with WBC up to 16.2. normal hemoglobin and hematocrit of 14.0 / 43.2, platelet normal at 262. Differential did [place] [address]. CMP was normal except for AST 44 and ALT 47. Thyroid panel with TSH of 2.590, free T4 of 1.25, Vitamin D normal at 46.0, B-12 844.   09/15/2023:WBC 14.49, hemoglobin of 14.9, MCV 92, platelet 450. Diff with elevated neutrophils of 10.600.  11/12/2019. WBC 12.71, diff normal except for immature gran % elevated slightly.         Oncology/Hematology History    No history exists.       Review of Systems   Constitutional:  Positive for fatigue.   HENT: Negative.     Eyes: Negative.    Respiratory: Negative.     Cardiovascular: Negative.    Gastrointestinal: Negative.    Endocrine: Negative.    Genitourinary: Negative.    Musculoskeletal:   Positive for back pain.   Allergic/Immunologic: Negative.    Neurological: Negative.    Hematological: Negative.    Psychiatric/Behavioral: Negative.         Current Outpatient Medications on File Prior to Visit   Medication Sig Dispense Refill    atorvastatin (LIPITOR) 80 MG tablet Take 1 tablet by mouth Daily.      Blood Glucose Monitoring Suppl (OneTouch Verio Reflect) w/Device kit       glucagon (GlucaGen HypoKit) 1 MG injection Use once in case of severe Hypogkycemia      glucose blood test strip OneTouch Verio test strips      Insulin Lispro (HumaLOG) 100 UNIT/ML solution cartridge Humalog 100 unit/mL subcutaneous cartridge inject by subcutaneous route per prescriber's instructions. Insulin dosing requires individualization.   Active      Lancets (OneTouch Delica Plus [id number]) misc       Levemir FlexPen 100 UNIT/ML injection 22u am and 32u pm      levothyroxine (SYNTHROID, LEVOTHROID) 100 MCG tablet Take 1 tablet by mouth Daily.      metFORMIN (GLUCOPHAGE) 500 MG tablet Take 1 tablet by mouth 2 (Two) Times a Day.      ondansetron ODT (ZOFRAN-ODT) 4 MG disintegrating tablet Place 1 tablet on the tongue 4 (Four) Times a Day As Needed for Nausea or Vomiting. 20 tablet 0    OneTouch Verio test strip        No current facility-administered medications on file prior to visit.       Allergies   Allergen Reactions    Codeine Unknown - High Severity and Other (See Comments)    Penicillins Unknown - High Severity and Rash     Past Medical History:   Diagnosis Date    Diabetes mellitus      Past Surgical History:   Procedure Laterality Date    ADENOIDECTOMY      TONSILLECTOMY       Social History     Socioeconomic History    Marital status:    Tobacco Use    Smoking status: Never    Smokeless tobacco: Never   Vaping Use    Vaping status: Never Used   Substance and Sexual Activity    Alcohol use: Never    Drug use: Never    Sexual activity: Not Currently     Family History   Problem Relation Age of Onset     Breast cancer Maternal Grandmother      Immunization History   Administered Date(s) Administered    COVID-19 (MODERNA) 1st,2nd,3rd Dose Monovalent 04/17/2021, 05/15/2021, 12/01/2021    COVID-19 (PFIZER) BIVALENT 12+YRS 10/27/2022       Tobacco Use: Low Risk  (7/3/2024)    Patient History     Smoking Tobacco Use: Never     Smokeless Tobacco Use: Never     Passive Exposure: Not on file   Recent Concern: Tobacco Use - Medium Risk (5/31/2024)    Received from [facility]    Patient History     Smoking Tobacco Use: Former     Smokeless Tobacco Use: Never     Passive Exposure: Not on file       Objective     Physical Exam  Vitals and nursing note reviewed.   Constitutional:       Appearance: Normal appearance. She is obese.   HENT:      Head: Normocephalic.      Nose: Nose normal.      Mouth/Throat:      Mouth: Mucous membranes are moist.   Eyes:      Pupils: Pupils are equal, round, and reactive to light.   Cardiovascular:      Rate and Rhythm: Normal rate and regular rhythm.      Pulses: Normal pulses.      Heart sounds: Normal heart sounds.   Pulmonary:      Effort: Pulmonary effort is normal.      Breath sounds: Normal breath sounds.   Abdominal:      General: Bowel sounds are normal. There is no distension.      Palpations: Abdomen is soft.   Musculoskeletal:         General: Normal range of motion.      Cervical back: Normal range of motion and neck supple.   Skin:     General: Skin is warm and dry.      Capillary Refill: Capillary refill takes less than 2 seconds.   Neurological:      General: No focal deficit present.      Mental Status: She is alert and oriented to person, place, and time.   Psychiatric:         Mood and Affect: Mood normal.         Behavior: Behavior normal.         Thought Content: Thought content normal.         Judgment: Judgment normal.         Vitals:    07/03/24 0856   BP: 149/65   Pulse: 69   Resp: 20   Temp: 96.9 °F (36.1 °C)   SpO2: 100%   Weight: 96.6 kg (213 lb)   PainSc: 0-No  "pain       Wt Readings from Last 3 Encounters:   07/03/24 96.6 kg (213 lb)   09/15/23 95 kg (209 lb 7 oz)   09/17/20 100 kg (221 lb 5 oz)               ECOG score: 0         ECOG: (0) Fully Active - Able to Carry On All Pre-disease Performance Without Restriction  Fall Risk Assessment was completed, and patient is at low risk for falls.  PHQ-9 Total Score: 0       The patient is  experiencing fatigue. Fatigue score: 4    PT/OT Functional Screening: PT fx screen : No needs identified  Speech Functional Screening: Speech fx screen : No needs identified  Rehab to be ordered: Rehab to be ordered : No needs identified        Result Review :  The following data was reviewed by: RYANN Hays on 07/03/2024:  Lab Results   Component Value Date    HGB 14.9 09/15/2023    HCT 43.8 09/15/2023    MCV 92.2 09/15/2023     09/15/2023    WBC 14.49 (H) 09/15/2023    NEUTROABS 10.60 (H) 09/15/2023    LYMPHSABS 2.64 09/15/2023    MONOSABS 0.88 09/15/2023    EOSABS 0.19 09/15/2023    BASOSABS 0.11 09/15/2023     Lab Results   Component Value Date    GLUCOSE 117 (H) 09/15/2023    BUN 10 09/15/2023    CREATININE 0.88 09/15/2023     09/15/2023    K 3.8 09/15/2023     09/15/2023    CO2 25.2 09/15/2023    CALCIUM 10.2 09/15/2023    PROTEINTOT 7.7 09/15/2023    ALBUMIN 4.7 09/15/2023    BILITOT 0.3 09/15/2023    ALKPHOS 116 09/15/2023    AST 38 (H) 09/15/2023    ALT 30 09/15/2023        No results found for: \"IRON\", \"LABIRON\", \"TRANSFERRIN\", \"TIBC\"  No results found for: \"LDH\", \"FERRITIN\", \"HSNRTGBI79\", \"FOLATE\"  No results found for: \"PSA\", \"CEA\", \"AFP\", \"\", \"\"    Mammo Screening Digital Tomosynthesis Bilateral With CAD    Result Date: 4/23/2024  No mammographic signs of malignancy.  Recommend annual screening mammography  TISSUE DENSITY:  There are scattered areas of fibroglandular density.  BI-RADS ASSESSMENT: BI-RADS 1. Negative.   Note: It has been reported that there is approximately a 15% " false negative in mammography. Therefore, management of a palpable abnormality should not be deferred because of a negative mammogram.           Electronically Signed By-Enrike Burleson MD On:4/23/2024 1:14 PM             Assessment and Plan:  Diagnoses and all orders for this visit:    1. Leukocytosis, unspecified type (Primary)  -     CBC & Differential  -     Peripheral Blood Smear  -     Comprehensive Metabolic Panel  -     Flow Cytometry, Blood  -     CBC & Differential; Future    Chronic leukocytosis dating back to at least 11/2019 in the range of 12.71 and at its max of 16.2 back in May of 2024.  Likely reactive and secondary to history of chronic tobacco abuse. History of steroid injections every 4 months for chronic back pain. Liver enzymes are mildly elevated, so fatty liver would be in the differential as well for causing the mild leukocytosis. History of DM 2 and is on insulin injections.  Denies any concerning symptomatology including weight loss, night sweats, fevers, chills. Has not been on any antibiotic therapy. Recently quit smoking back about 9 months ago. We discussed low dose CT screening yearly and encouraged her to discuss with her PCP for ordering. She is up to date on mammogram and colonoscopy screenings.     Will check CBC with diff, peripheral smear, flow cytometry today and CMP. Will call or my chart lab results to discuss. Follow up with MD in 3 months with repeat CBC on day of visit.     I spent 30 minutes caring for Maggie on this date of service. This time includes time spent by me in the following activities:preparing for the visit, reviewing tests, obtaining and/or reviewing a separately obtained history, performing a medically appropriate examination and/or evaluation , counseling and educating the patient/family/caregiver, ordering medications, tests, or procedures, referring and communicating with other health care professionals , documenting information in the medical record,  and independently interpreting results and communicating that information with the patient/family/caregiver    Patient Follow Up: 3 months with MD.     Patient was given instructions and counseling regarding her condition or for health maintenance advice. Please see specific information pulled into the AVS if appropriate.     Brii Ag, APRN    7/3/2024    .tob

## 2024-07-05 LAB
CYTO UR: NORMAL
LAB AP CASE REPORT: NORMAL
LAB AP CLINICAL INFORMATION: NORMAL
Lab: NORMAL
PATH REPORT.FINAL DX SPEC: NORMAL
PATH REPORT.GROSS SPEC: NORMAL

## 2024-10-07 ENCOUNTER — CLINICAL SUPPORT (OUTPATIENT)
Dept: FAMILY MEDICINE CLINIC | Facility: CLINIC | Age: 64
End: 2024-10-07
Payer: MEDICARE

## 2024-10-07 DIAGNOSIS — D72.829 LEUKOCYTOSIS, UNSPECIFIED TYPE: ICD-10-CM

## 2024-10-07 LAB
BASOPHILS # BLD AUTO: 0.16 10*3/MM3 (ref 0–0.2)
BASOPHILS NFR BLD AUTO: 1 % (ref 0–1.5)
DEPRECATED RDW RBC AUTO: 51.3 FL (ref 37–54)
EOSINOPHIL # BLD AUTO: 0.24 10*3/MM3 (ref 0–0.4)
EOSINOPHIL NFR BLD AUTO: 1.5 % (ref 0.3–6.2)
ERYTHROCYTE [DISTWIDTH] IN BLOOD BY AUTOMATED COUNT: 14.5 % (ref 12.3–15.4)
HCT VFR BLD AUTO: 39.9 % (ref 34–46.6)
HGB BLD-MCNC: 12.6 G/DL (ref 12–15.9)
IMM GRANULOCYTES # BLD AUTO: 0.07 10*3/MM3 (ref 0–0.05)
IMM GRANULOCYTES NFR BLD AUTO: 0.4 % (ref 0–0.5)
LYMPHOCYTES # BLD AUTO: 2.76 10*3/MM3 (ref 0.7–3.1)
LYMPHOCYTES NFR BLD AUTO: 17.3 % (ref 19.6–45.3)
MCH RBC QN AUTO: 30.5 PG (ref 26.6–33)
MCHC RBC AUTO-ENTMCNC: 31.6 G/DL (ref 31.5–35.7)
MCV RBC AUTO: 96.6 FL (ref 79–97)
MONOCYTES # BLD AUTO: 1.2 10*3/MM3 (ref 0.1–0.9)
MONOCYTES NFR BLD AUTO: 7.5 % (ref 5–12)
NEUTROPHILS NFR BLD AUTO: 11.49 10*3/MM3 (ref 1.7–7)
NEUTROPHILS NFR BLD AUTO: 72.3 % (ref 42.7–76)
NRBC BLD AUTO-RTO: 0 /100 WBC (ref 0–0.2)
PLATELET # BLD AUTO: 516 10*3/MM3 (ref 140–450)
PMV BLD AUTO: 10.5 FL (ref 6–12)
RBC # BLD AUTO: 4.13 10*6/MM3 (ref 3.77–5.28)
WBC NRBC COR # BLD AUTO: 15.92 10*3/MM3 (ref 3.4–10.8)

## 2024-10-07 PROCEDURE — 85025 COMPLETE CBC W/AUTO DIFF WBC: CPT | Performed by: NURSE PRACTITIONER

## 2024-10-07 PROCEDURE — 36415 COLL VENOUS BLD VENIPUNCTURE: CPT | Performed by: FAMILY MEDICINE

## 2024-10-07 NOTE — PROGRESS NOTES
..  Venipuncture Blood Specimen Collection  Venipuncture performed in LT arm by Josephine Carballo with good hemostasis. Patient tolerated the procedure well without complications.   10/07/24   Dunia Hurt MA

## 2024-10-11 ENCOUNTER — OFFICE VISIT (OUTPATIENT)
Dept: ONCOLOGY | Facility: HOSPITAL | Age: 64
End: 2024-10-11
Payer: MEDICARE

## 2024-10-11 VITALS
DIASTOLIC BLOOD PRESSURE: 77 MMHG | OXYGEN SATURATION: 98 % | WEIGHT: 216.27 LBS | RESPIRATION RATE: 18 BRPM | TEMPERATURE: 98.6 F | HEART RATE: 79 BPM | SYSTOLIC BLOOD PRESSURE: 146 MMHG | BODY MASS INDEX: 33.87 KG/M2

## 2024-10-11 DIAGNOSIS — E10.9 TYPE 1 DIABETES MELLITUS WITHOUT COMPLICATION: ICD-10-CM

## 2024-10-11 DIAGNOSIS — D72.829 LEUKOCYTOSIS, UNSPECIFIED TYPE: Primary | ICD-10-CM

## 2024-10-11 RX ORDER — INSULIN GLARGINE 100 [IU]/ML
INJECTION, SOLUTION SUBCUTANEOUS
COMMUNITY

## 2024-10-11 RX ORDER — CLOBETASOL PROPIONATE 0.5 MG/G
CREAM TOPICAL
COMMUNITY

## 2024-10-11 RX ORDER — BARICITINIB 2 MG/1
1 TABLET, FILM COATED ORAL DAILY
COMMUNITY

## 2024-10-11 RX ORDER — BARICITINIB 4 MG/1
TABLET, FILM COATED ORAL
COMMUNITY
Start: 2024-10-02

## 2024-10-11 RX ORDER — ALBUTEROL SULFATE 90 UG/1
INHALANT RESPIRATORY (INHALATION)
COMMUNITY

## 2024-10-11 RX ORDER — EZETIMIBE 10 MG/1
TABLET ORAL
COMMUNITY
Start: 2023-11-10

## 2024-10-11 NOTE — PROGRESS NOTES
Chief Complaint/Reason for Referral:  FOLLOW UP 2 -    Maxine Trammell MD King, Anne L., MD    Records Obtained:  Records of the patients history including those obtained from  [facility] and patient information were reviewed and summarized in detail.    Subjective    History of Present Illness  Ms. Maggie Guzmán is a very pleasant 63 year old  female who presents to hematology for follow up for leukocytosis. Her PMH includes former smoker, DM on insulin, receives steroid injections every 4 months for pain control, thyroid issues. Reports she has noticed more fatigue than usual but attributes this to caring for her grandchildren. Feels like the fatigue is improving.  Weight does go up and down. Denies night sweats. Denies any fevers, chills, cough, SOA or loose stools. No bleeding issues. Denies any family history of blood cancers. Grandmother had uterine cancer.      Hematology History    11/12/2019. WBC 12.71, diff normal except for immature gran % elevated slightly.   09/15/2023:WBC 14.49, hemoglobin of 14.9, MCV 92, platelet 450. Diff with elevated neutrophils of 10.600.  5/8/2024: WBC 16.2. Normal hemoglobin and hematocrit of 14.0 / 43.2, platelet normal at 262. Differential did show increased [place] up [place] [address]. CMP was normal except for AST 44 and ALT 47. Thyroid panel with TSH of 2.590, free T4 of 1.25, Vitamin D normal at 46.0, B-12 844.   7/3/24: WBC 11.61, hgb 13.6, plt 342, ANC elevated 8.94, smear with normal morphology, flow cytometry with non-specific CD56 expression on granulocytes and monocytes.   10/7/24: WBC 15.92, hgb 12.6, plt 516, ANC elevated, monocytes elevated 1.2, immature grans slightly elevated at 0.07      Oncology/Hematology History    No history exists.       Review of Systems   Constitutional:  Positive for fatigue. Negative for appetite change, diaphoresis, fever, unexpected weight gain and unexpected weight loss.   HENT: Negative.  Negative for hearing loss, mouth sores, sore  throat, swollen glands, trouble swallowing and voice change.    Eyes: Negative.  Negative for blurred vision.   Respiratory: Negative.  Negative for cough, shortness of breath and wheezing.    Cardiovascular: Negative.  Negative for chest pain and palpitations.   Gastrointestinal: Negative.  Negative for abdominal pain, blood in stool, constipation, diarrhea, nausea and vomiting.   Endocrine: Negative.  Negative for cold intolerance and heat intolerance.   Genitourinary: Negative.  Negative for difficulty urinating, dysuria, frequency, hematuria and urinary incontinence.   Musculoskeletal:  Positive for back pain. Negative for arthralgias and myalgias.   Skin:  Negative for rash, skin lesions and wound.   Allergic/Immunologic: Negative.    Neurological: Negative.  Negative for dizziness, seizures, weakness, numbness and headache.   Hematological: Negative.  Does not bruise/bleed easily.   Psychiatric/Behavioral: Negative.  Negative for depressed mood. The patient is not nervous/anxious.    All other systems reviewed and are negative.      Current Outpatient Medications on File Prior to Visit   Medication Sig Dispense Refill    atorvastatin (LIPITOR) 80 MG tablet Take 1 tablet by mouth Daily.      Blood Glucose Monitoring Suppl (OneTouch Verio Reflect) w/Device kit       glucagon (GlucaGen HypoKit) 1 MG injection Use once in case of severe Hypogkycemia      glucose blood test strip OneTouch Verio test strips      Insulin Lispro (HumaLOG) 100 UNIT/ML solution cartridge Humalog 100 unit/mL subcutaneous cartridge inject by subcutaneous route per prescriber's instructions. Insulin dosing requires individualization.   Active      Lancets (OneTouch Delica Plus [id number]) misc       Levemir FlexPen 100 UNIT/ML injection 22u am and 32u pm      levothyroxine (SYNTHROID, LEVOTHROID) 100 MCG tablet Take 1 tablet by mouth Daily.      metFORMIN (GLUCOPHAGE) 500 MG tablet Take 1 tablet by mouth 2 (Two) Times a Day.       ondansetron ODT (ZOFRAN-ODT) 4 MG disintegrating tablet Place 1 tablet on the tongue 4 (Four) Times a Day As Needed for Nausea or Vomiting. 20 tablet 0    OneTouch Verio test strip        No current facility-administered medications on file prior to visit.       Allergies   Allergen Reactions    Adhesive Tape Hives    Codeine Unknown - High Severity and Other (See Comments)    Latex Hives    Metformin Unknown - Low Severity     Caused infection in her pancreas    Penicillins Unknown - High Severity and Rash     Past Medical History:   Diagnosis Date    Diabetes mellitus      Past Surgical History:   Procedure Laterality Date    ADENOIDECTOMY      TONSILLECTOMY       Social History     Socioeconomic History    Marital status:    Tobacco Use    Smoking status: Never    Smokeless tobacco: Never   Vaping Use    Vaping status: Never Used   Substance and Sexual Activity    Alcohol use: Never    Drug use: Never    Sexual activity: Not Currently     Family History   Problem Relation Age of Onset    Breast cancer Maternal Grandmother      Immunization History   Administered Date(s) Administered    COVID-19 (MODERNA) 1st,2nd,3rd Dose Monovalent 04/17/2021, 05/15/2021, 12/01/2021    COVID-19 (PFIZER) 12YRS+ (COMIRNATY) 09/04/2024    COVID-19 (PFIZER) BIVALENT 12+YRS 10/27/2022    Flublok 18+yrs 10/09/2021, 10/27/2022    Fluzone  >6mos 11/29/2010, 10/11/2011, 11/02/2012, 11/09/2013, 10/17/2014    Fluzone (or Fluarix & Flulaval for VFC) >6mos 09/21/2015    Fluzone High-Dose 65+YRS 09/04/2024    Shingrix 12/24/2020    Tdap 01/30/2018       Tobacco Use: Low Risk  (10/11/2024)    Patient History     Smoking Tobacco Use: Never     Smokeless Tobacco Use: Never     Passive Exposure: Not on file   Recent Concern: Tobacco Use - Medium Risk (9/19/2024)    Received from [facility]    Patient History     Smoking Tobacco Use: Former     Smokeless Tobacco Use: Never     Passive Exposure: Not on file       Objective     Physical  "Exam  Well appearing patient in no acute distress on RA  Anicteric sclerae, no rash on exposed skin  Respirations non-labored  Awake, alert, and oriented x 4. Speech intact. No gross neurologic deficit  Appropriate mood and affect    Vitals:    10/11/24 1042   BP: 146/77   Pulse: 79   Resp: 18   Temp: 98.6 °F (37 °C)   TempSrc: Temporal   SpO2: 98%   Weight: 98.1 kg (216 lb 4.3 oz)   PainSc: 0-No pain         Wt Readings from Last 3 Encounters:   07/03/24 96.6 kg (213 lb)   09/15/23 95 kg (209 lb 7 oz)   09/17/20 100 kg (221 lb 5 oz)                     ECOG: (0) Fully Active - Able to Carry On All Pre-disease Performance Without Restriction  Fall Risk Assessment was completed, and patient is at low risk for falls.  PHQ-9 Total Score:         The patient is  experiencing fatigue. Fatigue score: 4    PT/OT Functional Screening: PT fx screen : No needs identified  Speech Functional Screening: Speech fx screen : No needs identified  Rehab to be ordered: Rehab to be ordered : No needs identified        Result Review :  The following data was reviewed by: Gianluca Frazier MD on 10/11/24:  Lab Results   Component Value Date    HGB 12.6 10/07/2024    HCT 39.9 10/07/2024    MCV 96.6 10/07/2024     (H) 10/07/2024    WBC 15.92 (H) 10/07/2024    NEUTROABS 11.49 (H) 10/07/2024    LYMPHSABS 2.76 10/07/2024    MONOSABS 1.20 (H) 10/07/2024    EOSABS 0.24 10/07/2024    BASOSABS 0.16 10/07/2024     Lab Results   Component Value Date    GLUCOSE 320 (H) 07/03/2024    BUN 11 07/03/2024    CREATININE 0.96 07/03/2024     07/03/2024    K 4.5 07/03/2024     07/03/2024    CO2 25.8 07/03/2024    CALCIUM 9.5 07/03/2024    PROTEINTOT 6.3 07/03/2024    ALBUMIN 4.1 07/03/2024    BILITOT 0.4 07/03/2024    ALKPHOS 83 07/03/2024    AST 22 07/03/2024    ALT 21 07/03/2024        No results found for: \"IRON\", \"LABIRON\", \"TRANSFERRIN\", \"TIBC\"  No results found for: \"LDH\", \"FERRITIN\", \"[id number]\", \"FOLATE\"  No results found for: " "\"PSA\", \"CEA\", \"AFP\", \"\", \"\"    Labs personally reviewed. Plts up. WBC up. ANC up. Hgb normal. Creatinine normal. Bili normal.     Endocrine note personally reviewed.         Assessment and Plan:  Diagnoses and all orders for this visit:    1. Leukocytosis, unspecified type (Primary)  -     CBC & Differential; Future    2. Type 1 diabetes mellitus without complication      Chronic leukocytosis   Present since at least 11/2019 in the range of 12.71 to 16.2 (May 2024).  Likely reactive and secondary to history of chronic tobacco abuse. History of steroid injections every 4 months for chronic back pain. Liver enzymes are mildly elevated. History of DM and is on insulin injections.  Denies any concerning symptomatology including unintentional weight loss, night sweats, fevers, chills.  Recently quit smoking back about 9 months ago. Smear normal. Elevation in mature neutrophils, occasional monocyte elevation but mild. Overall WBC elevation is mild and stable over several years. Patient with prior bone marrow years ago that was normal. Flow cytometry likely with reactive CD56 expression. Recommend trend CBC. Repeat CBC 6 months. Reserve bone marrow for worsening of WBC or development of anemia and/or thrombocytopenia.       DM  On insulin. Recommend endocrine follow up.       I spent 20 minutes caring for Maggie on this date of service. This time includes time spent by me in the following activities:preparing for the visit, reviewing tests, obtaining and/or reviewing a separately obtained history, performing a medically appropriate examination and/or evaluation , counseling and educating the patient/family/caregiver, ordering medications, tests, or procedures, referring and communicating with other health care professionals , documenting information in the medical record, and independently interpreting results and communicating that information with the patient/family/caregiver    Patient Follow Up: 6 " months    Patient was given instructions and counseling regarding her condition or for health maintenance advice. Please see specific information pulled into the AVS if appropriate.

## 2025-04-09 ENCOUNTER — TELEPHONE (OUTPATIENT)
Dept: ONCOLOGY | Facility: HOSPITAL | Age: 65
End: 2025-04-09
Payer: MEDICARE

## 2025-04-10 ENCOUNTER — TRANSCRIBE ORDERS (OUTPATIENT)
Dept: ADMINISTRATIVE | Facility: HOSPITAL | Age: 65
End: 2025-04-10
Payer: MEDICARE

## 2025-04-11 ENCOUNTER — TELEPHONE (OUTPATIENT)
Dept: GASTROENTEROLOGY | Facility: CLINIC | Age: 65
End: 2025-04-11
Payer: MEDICARE

## 2025-04-11 ENCOUNTER — OFFICE VISIT (OUTPATIENT)
Dept: ONCOLOGY | Facility: HOSPITAL | Age: 65
End: 2025-04-11
Payer: MEDICARE

## 2025-04-11 ENCOUNTER — LAB (OUTPATIENT)
Dept: ONCOLOGY | Facility: HOSPITAL | Age: 65
End: 2025-04-11
Payer: MEDICARE

## 2025-04-11 VITALS
OXYGEN SATURATION: 97 % | WEIGHT: 216.49 LBS | BODY MASS INDEX: 33.91 KG/M2 | HEART RATE: 67 BPM | SYSTOLIC BLOOD PRESSURE: 137 MMHG | DIASTOLIC BLOOD PRESSURE: 88 MMHG | TEMPERATURE: 97.1 F | RESPIRATION RATE: 18 BRPM

## 2025-04-11 DIAGNOSIS — Z79.4 TYPE 2 DIABETES MELLITUS WITHOUT COMPLICATION, WITH LONG-TERM CURRENT USE OF INSULIN: ICD-10-CM

## 2025-04-11 DIAGNOSIS — R53.83 FATIGUE, UNSPECIFIED TYPE: ICD-10-CM

## 2025-04-11 DIAGNOSIS — R53.83 FATIGUE, UNSPECIFIED TYPE: Primary | ICD-10-CM

## 2025-04-11 DIAGNOSIS — D50.0 IRON DEFICIENCY ANEMIA DUE TO CHRONIC BLOOD LOSS: ICD-10-CM

## 2025-04-11 DIAGNOSIS — D72.829 LEUKOCYTOSIS, UNSPECIFIED TYPE: ICD-10-CM

## 2025-04-11 DIAGNOSIS — E11.9 TYPE 2 DIABETES MELLITUS WITHOUT COMPLICATION, WITH LONG-TERM CURRENT USE OF INSULIN: ICD-10-CM

## 2025-04-11 DIAGNOSIS — R79.89 LFT ELEVATION: ICD-10-CM

## 2025-04-11 LAB
BASOPHILS # BLD AUTO: 0.1 10*3/MM3 (ref 0–0.2)
BASOPHILS NFR BLD AUTO: 0.8 % (ref 0–1.5)
DEPRECATED RDW RBC AUTO: 52.2 FL (ref 37–54)
EOSINOPHIL # BLD AUTO: 0.19 10*3/MM3 (ref 0–0.4)
EOSINOPHIL NFR BLD AUTO: 1.6 % (ref 0.3–6.2)
ERYTHROCYTE [DISTWIDTH] IN BLOOD BY AUTOMATED COUNT: 15.3 % (ref 12.3–15.4)
FERRITIN SERPL-MCNC: 27.36 NG/ML (ref 13–150)
FOLATE SERPL-MCNC: 14.6 NG/ML (ref 4.78–24.2)
HCT VFR BLD AUTO: 37.2 % (ref 34–46.6)
HGB BLD-MCNC: 11.8 G/DL (ref 12–15.9)
IMM GRANULOCYTES # BLD AUTO: 0.04 10*3/MM3 (ref 0–0.05)
IMM GRANULOCYTES NFR BLD AUTO: 0.3 % (ref 0–0.5)
IRON 24H UR-MRATE: 47 MCG/DL (ref 37–145)
IRON SATN MFR SERPL: 10 % (ref 20–50)
LYMPHOCYTES # BLD AUTO: 3.55 10*3/MM3 (ref 0.7–3.1)
LYMPHOCYTES NFR BLD AUTO: 29.3 % (ref 19.6–45.3)
MCH RBC QN AUTO: 29.8 PG (ref 26.6–33)
MCHC RBC AUTO-ENTMCNC: 31.7 G/DL (ref 31.5–35.7)
MCV RBC AUTO: 93.9 FL (ref 79–97)
MONOCYTES # BLD AUTO: 0.94 10*3/MM3 (ref 0.1–0.9)
MONOCYTES NFR BLD AUTO: 7.8 % (ref 5–12)
NEUTROPHILS NFR BLD AUTO: 60.2 % (ref 42.7–76)
NEUTROPHILS NFR BLD AUTO: 7.28 10*3/MM3 (ref 1.7–7)
NRBC BLD AUTO-RTO: 0 /100 WBC (ref 0–0.2)
PLATELET # BLD AUTO: 515 10*3/MM3 (ref 140–450)
PMV BLD AUTO: 9.2 FL (ref 6–12)
RBC # BLD AUTO: 3.96 10*6/MM3 (ref 3.77–5.28)
TIBC SERPL-MCNC: 457 MCG/DL (ref 298–536)
TRANSFERRIN SERPL-MCNC: 307 MG/DL (ref 200–360)
VIT B12 BLD-MCNC: 474 PG/ML (ref 211–946)
WBC NRBC COR # BLD AUTO: 12.1 10*3/MM3 (ref 3.4–10.8)

## 2025-04-11 PROCEDURE — G0463 HOSPITAL OUTPT CLINIC VISIT: HCPCS | Performed by: INTERNAL MEDICINE

## 2025-04-11 PROCEDURE — 85025 COMPLETE CBC W/AUTO DIFF WBC: CPT

## 2025-04-11 PROCEDURE — 82746 ASSAY OF FOLIC ACID SERUM: CPT

## 2025-04-11 PROCEDURE — 36415 COLL VENOUS BLD VENIPUNCTURE: CPT

## 2025-04-11 PROCEDURE — 82728 ASSAY OF FERRITIN: CPT

## 2025-04-11 PROCEDURE — 82607 VITAMIN B-12: CPT

## 2025-04-11 PROCEDURE — 83540 ASSAY OF IRON: CPT

## 2025-04-11 PROCEDURE — 84466 ASSAY OF TRANSFERRIN: CPT

## 2025-04-11 RX ORDER — FERROUS GLUCONATE 324(38)MG
324 TABLET ORAL
Qty: 90 TABLET | Refills: 1 | Status: SHIPPED | OUTPATIENT
Start: 2025-04-11

## 2025-04-11 RX ORDER — OSELTAMIVIR PHOSPHATE 75 MG/1
CAPSULE ORAL
COMMUNITY

## 2025-04-11 NOTE — TELEPHONE ENCOUNTER
Bridget from a providers office called and left a message on the [facility] line 04/10/2025. Then Bridget call the clinica again 04/11/2025. Informed her that there is not an order for a fibroscan that I can find in the patient chart. I had Bridget fax an order so I could have our provider review. I have indexed it into the chart. Please advise if we can order this or if the patient would need to be seen by gastro provider first.

## 2025-04-11 NOTE — PROGRESS NOTES
Chief Complaint/Reason for Referral:   FOLLOW UP 2    Maxine Trammell MD King, Anne L., MD    Records Obtained:  Records of the patients history including those obtained from  [facility] and patient information were reviewed and summarized in detail.    Subjective    History of Present Illness  Ms. Maggie Guzmán is a very pleasant 64 year old  female who presents to hematology for follow up for leukocytosis. Her PMH includes former smoker, DM on insulin, receives steroid injections every 4 months for pain control, thyroid issues.  She remains on insulin.  Follows with endocrine. She reports increased fatigue.  Reports it is daily.  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 No fevers or chills. No bleeding. She has been told her liver numbers are up.  She has an MRI of her liver planned.  She is on lower dose of statin because of this.  She is worried about the liver numbers. Still not smoking anymore.  White count is stable.  She is mildly anemic.  Platelets are up.    Hematology History    11/12/2019. WBC 12.71, diff normal except for immature gran % elevated slightly.   09/15/2023:WBC 14.49, hemoglobin of 14.9, MCV 92, platelet 450. Diff with elevated neutrophils of 10.600.  5/8/2024: WBC 16.2. Normal hemoglobin and hematocrit of 14.0 / 43.2, platelet normal at 262. Differential did show [place] [address]. CMP was normal except for AST 44 and ALT 47. Thyroid panel with TSH of 2.590, free T4 of 1.25, Vitamin D normal at 46.0, B-12 844.   7/3/24: WBC 11.61, hgb 13.6, plt 342, ANC elevated 8.94, smear with normal morphology, flow cytometry with non-specific CD56 expression on granulocytes and monocytes.   10/7/24: WBC 15.92, hgb 12.6, plt 516, ANC elevated, monocytes elevated 1.2, immature grans slightly elevated at 0.07  4/11/25: WBC 12.1, ANC 7.28, hgb 11.8, plt 515. Ferritin 27 - start oral iron daily.       Oncology/Hematology History    No history exists.       Review of Systems    Constitutional:  Positive for fatigue. Negative for appetite change, diaphoresis, fever, unexpected weight gain and unexpected weight loss.   HENT: Negative.  Negative for hearing loss, mouth sores, sore throat, swollen glands, trouble swallowing and voice change.    Eyes: Negative.  Negative for blurred vision.   Respiratory: Negative.  Negative for cough, shortness of breath and wheezing.    Cardiovascular: Negative.  Negative for chest pain and palpitations.   Gastrointestinal: Negative.  Negative for abdominal pain, blood in stool, constipation, diarrhea, nausea and vomiting.   Endocrine: Negative.  Negative for cold intolerance and heat intolerance.   Genitourinary: Negative.  Negative for difficulty urinating, dysuria, frequency, hematuria and urinary incontinence.   Musculoskeletal:  Positive for back pain. Negative for arthralgias and myalgias.   Skin:  Negative for rash, skin lesions and wound.   Allergic/Immunologic: Negative.    Neurological: Negative.  Negative for dizziness, seizures, weakness, numbness and headache.   Hematological: Negative.  Does not bruise/bleed easily.   Psychiatric/Behavioral: Negative.  Negative for depressed mood. The patient is not nervous/anxious.    All other systems reviewed and are negative.      Current Outpatient Medications on File Prior to Visit   Medication Sig Dispense Refill    albuterol sulfate  (90 Base) MCG/ACT inhaler       atorvastatin (LIPITOR) 80 MG tablet Take 1 tablet by mouth Daily.      Blood Glucose Monitoring Suppl (OneTouch Verio Reflect) w/Device kit       clobetasol propionate (TEMOVATE) 0.05 % cream       ezetimibe (ZETIA) 10 MG tablet       glucagon (GlucaGen HypoKit) 1 MG injection Use once in case of severe Hypogkycemia      glucose blood test strip OneTouch Verio test strips      Insulin Lispro (HumaLOG) 100 UNIT/ML solution cartridge Humalog 100 unit/mL subcutaneous cartridge inject by subcutaneous route per prescriber's instructions.  Insulin dosing requires individualization.   Active      Lancets (OneTouch Delica Plus [id number]) misc       Lantus SoloStar 100 UNIT/ML injection pen       Levemir FlexPen 100 UNIT/ML injection 22u am and 32u pm      levothyroxine (SYNTHROID, LEVOTHROID) 100 MCG tablet Take 1 tablet by mouth Daily.      metFORMIN (GLUCOPHAGE) 500 MG tablet Take 1 tablet by mouth 2 (Two) Times a Day.      Olumiant 2 MG tablet tablet Take 1 tablet by mouth Daily.      Olumiant 4 MG tablet  (Patient not taking: Reported on 4/11/2025)      ondansetron ODT (ZOFRAN-ODT) 4 MG disintegrating tablet Place 1 tablet on the tongue 4 (Four) Times a Day As Needed for Nausea or Vomiting. 20 tablet 0    OneTouch Verio test strip       oseltamivir (TAMIFLU) 75 MG capsule        No current facility-administered medications on file prior to visit.       Allergies   Allergen Reactions    Adhesive Tape Hives    Codeine Unknown - High Severity and Other (See Comments)    Latex Hives    Metformin Unknown - Low Severity     Caused infection in her pancreas    Penicillins Unknown - High Severity and Rash     Past Medical History:   Diagnosis Date    Diabetes mellitus      Past Surgical History:   Procedure Laterality Date    ADENOIDECTOMY      TONSILLECTOMY       Social History     Socioeconomic History    Marital status:    Tobacco Use    Smoking status: Never    Smokeless tobacco: Never   Vaping Use    Vaping status: Never Used   Substance and Sexual Activity    Alcohol use: Never    Drug use: Never    Sexual activity: Not Currently     Family History   Problem Relation Age of Onset    Breast cancer Maternal Grandmother      Immunization History   Administered Date(s) Administered    COVID-19 (MODERNA) 1st,2nd,3rd Dose Monovalent 04/17/2021, 05/15/2021, 12/01/2021    COVID-19 (PFIZER) 12YRS+ (COMIRNATY) 09/04/2024    COVID-19 (PFIZER) BIVALENT 12+YRS 10/27/2022    Flublok 18+yrs 10/09/2021, 10/27/2022    Fluzone  >6mos 11/29/2010, 10/11/2011,  11/02/2012, 11/09/2013, 10/17/2014    Fluzone (or Fluarix & Flulaval for VFC) >6mos 09/21/2015    Fluzone High-Dose 65+YRS 09/04/2024    Shingrix 12/24/2020    Tdap 01/30/2018       Tobacco Use: Low Risk  (4/11/2025)    Patient History     Smoking Tobacco Use: Never     Smokeless Tobacco Use: Never     Passive Exposure: Not on file       Objective     Physical Exam  Well appearing patient in no acute distress on RA  Anicteric sclerae, no rash on exposed skin  Respirations non-labored  Awake, alert, and oriented x 4. Speech intact. No gross neurologic deficit  Appropriate mood and affect    Vitals:    04/11/25 1456   BP: 137/88   Pulse: 67   Resp: 18   Temp: 97.1 °F (36.2 °C)   TempSrc: Temporal   SpO2: 97%   Weight: 98.2 kg (216 lb 7.9 oz)   PainSc: 0-No pain           Wt Readings from Last 3 Encounters:   10/11/24 98.1 kg (216 lb 4.3 oz)   07/03/24 96.6 kg (213 lb)   09/15/23 95 kg (209 lb 7 oz)                     ECOG: (0) Fully Active - Able to Carry On All Pre-disease Performance Without Restriction  Fall Risk Assessment was completed, and patient is at low risk for falls.  PHQ-9 Total Score:         The patient is  experiencing fatigue. Fatigue score: 4    PT/OT Functional Screening: PT fx screen : No needs identified  Speech Functional Screening: Speech fx screen : No needs identified  Rehab to be ordered: Rehab to be ordered : No needs identified        Result Review :  The following data was reviewed by: Gianluca Frazier MD on 04/11/25:  Lab Results   Component Value Date    HGB 11.8 (L) 04/11/2025    HCT 37.2 04/11/2025    MCV 93.9 04/11/2025     (H) 04/11/2025    WBC 12.10 (H) 04/11/2025    NEUTROABS 7.28 (H) 04/11/2025    LYMPHSABS 3.55 (H) 04/11/2025    MONOSABS 0.94 (H) 04/11/2025    EOSABS 0.19 04/11/2025    BASOSABS 0.10 04/11/2025     Lab Results   Component Value Date    GLUCOSE 320 (H) 07/03/2024    BUN 11 07/03/2024    CREATININE 0.96 07/03/2024     07/03/2024    K 4.5 07/03/2024  "    07/03/2024    CO2 25.8 07/03/2024    CALCIUM 9.5 07/03/2024    PROTEINTOT 6.3 07/03/2024    ALBUMIN 4.1 07/03/2024    BILITOT 0.4 07/03/2024    ALKPHOS 83 07/03/2024    AST 22 07/03/2024    ALT 21 07/03/2024     No results found for: \"LDH\", \"FERRITIN\", \"FOLATE\"  No results found for: \"IRON\", \"LABIRON\", \"TRANSFERRIN\", \"TIBC\"  No results found for: \"LDH\", \"FERRITIN\", \"AFRFRRKT29\", \"FOLATE\"  No results found for: \"PSA\", \"CEA\", \"AFP\", \"\", \"\"    Labs personally reviewed. Plts up. WBC up. ANC up. Hgb normal. Creatinine normal. Bili normal. LFTs increased. Iron levels low.     Endocrine note personally reviewed.         Assessment and Plan:  Diagnoses and all orders for this visit:    1. Fatigue, unspecified type (Primary)  -     Ferritin; Future  -     Iron Profile; Future  -     Folate; Future  -     Vitamin B12; Future  -     Ferritin; Future  -     Iron Profile; Future  -     Vitamin B12; Future  -     Folate; Future    2. Leukocytosis, unspecified type  -     CBC & Differential; Future    3. Iron deficiency anemia due to chronic blood loss  -     Ferritin; Future  -     Iron Profile; Future  -     Ferritin; Future  -     Iron Profile; Future    4. LFT elevation    5. Type 2 diabetes mellitus without complication, with long-term current use of insulin    Other orders  -     ferrous gluconate (FERGON) 324 MG tablet; Take 1 tablet by mouth Daily With Breakfast.  Dispense: 90 tablet; Refill: 1        Chronic leukocytosis   SHANIKA  Present since at least 11/2019 in the range of 12.71 to 16.2 (May 2024).  Likely reactive and secondary to history of chronic tobacco abuse, liver issues, steroid injections for chronic back pain, T2DM on insulin. Denies any concerning symptomatology including unintentional weight loss, night sweats, fevers, chills.  Recently quit smoking > 1 year months ago. Smear normal. Elevation in mature neutrophils, occasional monocyte elevation but mild. Overall WBC elevation is mild and " stable over several years. Patient with prior bone marrow years ago that was normal. Flow cytometry likely with reactive CD56 expression. Recommend trend CBC. Repeat CBC 4/11/25 stable. Results discussed. Do not feel that her fatigue is from underlying blood disorder. 61507: Due to fatigue: will check iron studies, B12, and folate. Iron levels low - oral iron sent in. Counseled on use RTC 3 months with repeat labs. If iron still low, will do IV iron. Reserve bone marrow for worsening of WBC or development of significant anemia and/or thrombocytopenia.       DM  On insulin. Recommend endocrine follow up.     LFT elevation  Bili normal. Mild level. MRI planned. Reduced dose of statin.        Patient Follow Up: 3 months    Patient was given instructions and counseling regarding her condition or for health maintenance advice. Please see specific information pulled into the AVS if appropriate.

## 2025-04-14 ENCOUNTER — TELEPHONE (OUTPATIENT)
Dept: ONCOLOGY | Facility: HOSPITAL | Age: 65
End: 2025-04-14
Payer: MEDICARE

## 2025-04-14 NOTE — TELEPHONE ENCOUNTER
Detailed voicemail left with 10/11/25 appointment change to July with labs a few days prior per Dr. Frazier. Reminder also mailed to patient

## 2025-04-15 ENCOUNTER — TELEPHONE (OUTPATIENT)
Dept: GASTROENTEROLOGY | Facility: CLINIC | Age: 65
End: 2025-04-15
Payer: MEDICARE

## 2025-04-15 NOTE — TELEPHONE ENCOUNTER
Reviewed order and dx.  Will need to see a GI provider to w/u elevated liver enzymes and determine if fibroscan is indicated.

## 2025-04-15 NOTE — TELEPHONE ENCOUNTER
Heidy with [facility] left a voicemail in regards to a referral that was sent over for a Fibroscan,     Per communication on 4/14/2025 pt needs referred to GI, pt is est with  so they would need to be scheduled with his office.       Heidy is aware

## 2025-04-16 ENCOUNTER — TELEPHONE (OUTPATIENT)
Dept: GASTROENTEROLOGY | Facility: CLINIC | Age: 65
End: 2025-04-16
Payer: MEDICARE

## 2025-04-16 NOTE — TELEPHONE ENCOUNTER
Caller: Maggie Guzmán    Relationship: Self    Best call back number: [phone]    What orders are you requesting (i.e. lab or imaging): IMAGING    In what timeframe would the patient need to come in: ASAP    Where will you receive your lab/imaging services: [facility]    Additional notes: PT STATED DR. DE SANTIAGO OFFICE WAS SUPPOSED TO SCHEDULED A ULTRASOUND OF THE LIVER  PER  HER PROVIDER

## 2025-05-25 ENCOUNTER — HOSPITAL ENCOUNTER (EMERGENCY)
Facility: HOSPITAL | Age: 65
Discharge: HOME | End: 2025-05-25
Attending: EMERGENCY MEDICINE | Admitting: EMERGENCY MEDICINE
Payer: MEDICARE

## 2025-05-25 ENCOUNTER — APPOINTMENT (OUTPATIENT)
Dept: GENERAL RADIOLOGY | Facility: HOSPITAL | Age: 65
End: 2025-05-25
Payer: MEDICARE

## 2025-05-25 VITALS
TEMPERATURE: 98.6 F | SYSTOLIC BLOOD PRESSURE: 130 MMHG | OXYGEN SATURATION: 97 % | RESPIRATION RATE: 18 BRPM | DIASTOLIC BLOOD PRESSURE: 87 MMHG | BODY MASS INDEX: 28.41 KG/M2 | HEIGHT: 67 IN | HEART RATE: 79 BPM | WEIGHT: 181 LBS

## 2025-05-25 DIAGNOSIS — M25.562 ACUTE PAIN OF LEFT KNEE: Primary | ICD-10-CM

## 2025-05-25 DIAGNOSIS — M25.462 EFFUSION OF LEFT KNEE: ICD-10-CM

## 2025-05-25 PROCEDURE — 99283 EMERGENCY DEPT VISIT LOW MDM: CPT

## 2025-05-25 PROCEDURE — 73562 X-RAY EXAM OF KNEE 3: CPT

## 2025-05-25 RX ORDER — BACLOFEN 10 MG/1
10 TABLET ORAL 3 TIMES DAILY PRN
Qty: 21 TABLET | Refills: 0 | Status: SHIPPED | OUTPATIENT
Start: 2025-05-25

## 2025-05-25 RX ORDER — CELECOXIB 100 MG/1
100 CAPSULE ORAL ONCE
Status: COMPLETED | OUTPATIENT
Start: 2025-05-25 | End: 2025-05-25

## 2025-05-25 RX ORDER — CELECOXIB 100 MG/1
100 CAPSULE ORAL 2 TIMES DAILY
Qty: 14 CAPSULE | Refills: 0 | Status: SHIPPED | OUTPATIENT
Start: 2025-05-25 | End: 2025-06-01

## 2025-05-25 RX ORDER — BACLOFEN 10 MG/1
10 TABLET ORAL ONCE
Status: COMPLETED | OUTPATIENT
Start: 2025-05-25 | End: 2025-05-25

## 2025-05-25 RX ADMIN — CELECOXIB 100 MG: 100 CAPSULE ORAL at 18:00

## 2025-05-25 RX ADMIN — BACLOFEN 10 MG: 10 TABLET ORAL at 18:00

## 2025-05-25 NOTE — DISCHARGE INSTRUCTIONS
Follow up with your primary care provider. Return to ER if symptoms worsen or fail to improve.  You are being referred to orthopedic surgery they should call you in 2-3 business days, however, if you have not heard from them give them a call using the number provided.  Take baclofen as needed for spasms in the leg.  Take Celebrex 2 times daily for the next 7 days, do not take ibuprofen or Advil while taking this medication however you may take Tylenol but do not exceed 4 g in 24 hours.  Wear brace as needed for comfort.

## 2025-05-25 NOTE — ED PROVIDER NOTES
Time: 5:34 PM EDT  Date of encounter:  5/25/2025  Independent Historian/Clinical History and Information was obtained by:   Patient    History is limited by: N/A    Chief Complaint: Knee pain      History of Present Illness:  Patient is a 64 y.o. year old female who presents to the emergency department for evaluation of left-sided knee pain.  Patient states that has been hurting for the past 2 weeks.  However today she fell down landing on he knee.  Patient states it feels like the pain is behind her knee and is worse when she is walking or bends it.  Patient denies any other injuries besides the fall today.  Patient states there are some pins and needle feeling in her foot and toes and does have a history of type 1 diabetes.  Patient took ibuprofen for the pain today around noon which seemed to help however when she got done mowing her grass the pain was more intense than before.  She is weightbearing.      Patient Care Team  Primary Care Provider: Maxine Trammell MD    Past Medical History:     Allergies   Allergen Reactions    Adhesive Tape Hives    Codeine Unknown - High Severity and Other (See Comments)    Latex Hives    Metformin Unknown - Low Severity     Caused infection in her pancreas    Penicillins Unknown - High Severity and Rash     Past Medical History:   Diagnosis Date    Diabetes mellitus      Past Surgical History:   Procedure Laterality Date    ADENOIDECTOMY      TONSILLECTOMY       Family History   Problem Relation Age of Onset    Breast cancer Maternal Grandmother        Home Medications:  Prior to Admission medications    Medication Sig Start Date End Date Taking? Authorizing Provider   albuterol sulfate  (90 Base) MCG/ACT inhaler     ProviderDahiana MD   atorvastatin (LIPITOR) 80 MG tablet Take 1 tablet by mouth Daily.  Patient taking differently: Take 0.5 tablets by mouth Daily. 9/1/23   ProviderDahiana MD   Blood Glucose Monitoring Suppl (OneTouch Verio Reflect) w/Device kit   7/20/23   Dahiana Chi MD   clobetasol propionate (TEMOVATE) 0.05 % cream     Dahiana Chi MD   ezetimibe (ZETIA) 10 MG tablet  11/10/23   Dahiana Chi MD   ferrous gluconate (FERGON) 324 MG tablet Take 1 tablet by mouth Daily With Breakfast. 4/11/25   Gianluca Frazier MD   glucagon (GlucaGen HypoKit) 1 MG injection Use once in case of severe Hypogkycemia  Patient not taking: Reported on 4/11/2025 7/14/23   Dahiana Chi MD   glucose blood test strip OneTouch Verio test strips    Dahiana Chi MD   Insulin Lispro (HumaLOG) 100 UNIT/ML solution cartridge Humalog 100 unit/mL subcutaneous cartridge inject by subcutaneous route per prescriber's instructions. Insulin dosing requires individualization.   Active    Dahiana Chi MD   Lancets (OneTouch Delica Plus [id number]) misc  8/26/23   Dahiana Chi MD   Lantus SoloStar 100 UNIT/ML injection pen     Dahiana Chi MD   Levemir FlexPen 100 UNIT/ML injection 22u am and 32u pm 9/1/23   Dahiana Chi MD   levothyroxine (SYNTHROID, LEVOTHROID) 100 MCG tablet Take 1 tablet by mouth Daily. 9/1/23   Dahiana Chi MD   metFORMIN (GLUCOPHAGE) 500 MG tablet Take 1 tablet by mouth 2 (Two) Times a Day. 9/1/23   Dahiana Chi MD   Olumiant 2 MG tablet tablet Take 1 tablet by mouth Daily.  Patient not taking: Reported on 4/11/2025    Dahiana Chi MD   Olumiant 4 MG tablet  10/2/24   Dahiana Chi MD   ondansetron ODT (ZOFRAN-ODT) 4 MG disintegrating tablet Place 1 tablet on the tongue 4 (Four) Times a Day As Needed for Nausea or Vomiting.  Patient not taking: Reported on 4/11/2025 9/15/23   Marielos Drew APRN   OneTouch Verio test strip  8/26/23   Dahiana Chi MD   oseltamivir (TAMIFLU) 75 MG capsule     Dahiana Chi MD        Social History:   Social History     Tobacco Use    Smoking status: Some Days     Types: Cigarettes    Smokeless  "tobacco: Never   Vaping Use    Vaping status: Never Used   Substance Use Topics    Alcohol use: Never    Drug use: Never         Review of Systems:  Review of Systems   Constitutional:  Negative for fever.   Cardiovascular:  Negative for leg swelling.   Musculoskeletal:  Positive for arthralgias, gait problem and myalgias.        Physical Exam:  /87 (BP Location: Right arm, Patient Position: Sitting)   Pulse 79   Temp 98.6 °F (37 °C) (Oral)   Resp 18   Ht 170.2 cm (67\")   Wt 82.1 kg (181 lb)   SpO2 97%   BMI 28.35 kg/m²     Physical Exam  HENT:      Head: Normocephalic.      Mouth/Throat:      Mouth: Mucous membranes are moist.   Eyes:      Pupils: Pupils are equal, round, and reactive to light.   Pulmonary:      Effort: Pulmonary effort is normal.   Abdominal:      General: There is no distension.   Musculoskeletal:      Cervical back: Neck supple.      Left knee: No swelling, erythema, ecchymosis, bony tenderness or crepitus. Tenderness present over the patellar tendon. No ACL laxity or PCL laxity.Normal pulse.      Right foot: Normal.      Left foot: Normal.      Comments: Pain with flexion in left knee and extension of toes     Skin:     General: Skin is warm and dry.   Neurological:      General: No focal deficit present.      Mental Status: She is alert and oriented to person, place, and time.   Psychiatric:         Mood and Affect: Mood normal.         Behavior: Behavior normal.                    Medical Decision Making:      Comorbidities that affect care:    None    External Notes reviewed:    None      The following orders were placed and all results were independently analyzed by me:  Orders Placed This Encounter   Procedures    [facility] 06. Hinged Knee (); Left    XR Knee 3 View Left    Ambulatory Referral to Orthopedic Surgery       Medications Given in the Emergency Department:  Medications   baclofen (LIORESAL) tablet 10 mg (10 mg Oral Given 5/25/25 1800)   celecoxib " (CeleBREX) capsule 100 mg (100 mg Oral Given 5/25/25 1800)        ED Course:    ED Course as of 05/26/25 0108   Mon May 26, 2025   0108 XR Knee 3 View Left  Small effusion noted, however no fractures [AS]      ED Course User Index  [AS] Seaver, Alyce BRYANN       Labs:    Lab Results (last 24 hours)       ** No results found for the last 24 hours. **             Imaging:    XR Knee 3 View Left  Result Date: 5/25/2025  XR KNEE 3 VW LEFT Date of Exam: 5/25/2025 6:04 PM EDT Indication: pain and fall pain Comparison: None available. Findings: No fracture. No bony abnormality. No joint space loss or arthritic change. Suspect small amount of fluid in the suprapatellar bursa     Impression: 1. No evidence of fracture. 2. Suspect small joint effusion Electronically Signed: Curt Orlando  5/25/2025 6:21 PM EDT  Workstation ID: [id number]        Differential Diagnosis and Discussion:    Myalgia: Differential diagnosis includes but is not limited to muscle overuse or strain, infections, inflammatory conditions, autoimmune diseases, medications, metabolic disorders, fibromyalgia, vascular disorders, neurological conditions, psychological factors, toxins, and muscle disorders.    PROCEDURES:    X-ray were performed in the emergency department and all X-ray impressions were independently interpreted by me.    No orders to display       Procedures    MDM     Amount and/or Complexity of Data Reviewed  Tests in the radiology section of CPT®: reviewed               Patient Care Considerations:    NARCOTICS: I considered prescribing opiate pain medication as an outpatient, however pain controlled with current treatment regimen      Consultants/Shared Management Plan:    None    Social Determinants of Health:    Patient is independent, reliable, and has access to care.       Disposition and Care Coordination:    Discharged: The patient is suitable and stable for discharge with no need for consideration of admission.    I have explained  the patient´s condition, diagnoses and treatment plan based on the information available to me at this time. I have answered questions and addressed any concerns. The patient has a good  understanding of the patient´s diagnosis, condition, and treatment plan as can be expected at this point. The vital signs have been stable. The patient´s condition is stable and appropriate for discharge from the emergency department.      The patient will pursue further outpatient evaluation with the primary care physician or other designated or consulting physician as outlined in the discharge instructions. They are agreeable to this plan of care and follow-up instructions have been explained in detail. The patient has received these instructions in written format and has expressed an understanding of the discharge instructions. The patient is aware that any significant change in condition or worsening of symptoms should prompt an immediate return to this or the closest emergency department or call to 911.  I have explained discharge medications and the need for follow up with the patient/caretakers. This was also printed in the discharge instructions. Patient was discharged with the following medications and follow up:      Medication List        New Prescriptions      baclofen 10 MG tablet  Commonly known as: LIORESAL  Take 1 tablet by mouth 3 (Three) Times a Day As Needed for Muscle Spasms.     celecoxib 100 MG capsule  Commonly known as: CeleBREX  Take 1 capsule by mouth 2 (Two) Times a Day for 7 days.               Where to Get Your Medications        These medications were sent to [facility]/pharmacy #[id number] - [place] Keen - [address] - [phone]  - [phone] FX  [address] [place] [address]      Hours: 24-hours Phone: [phone]   baclofen 10 MG tablet  celecoxib 100 MG capsule      [facility]  [address]  [facility] [id number] [phone]        Maxine Trammell  MD JOSELITO  [address] [facility]  INTERNAL MEDICINE  [facility] [address]  [phone]             Final diagnoses:   Acute pain of left knee   Effusion of left knee        ED Disposition       ED Disposition   Discharge    Condition   Stable    Comment   --               This medical record created using voice recognition software.             Seaver, Alyce B, APRN  05/26/25 0108

## 2025-06-03 ENCOUNTER — OFFICE VISIT (OUTPATIENT)
Dept: ORTHOPEDIC SURGERY | Facility: CLINIC | Age: 65
End: 2025-06-03
Payer: MEDICARE

## 2025-06-03 VITALS
WEIGHT: 181 LBS | BODY MASS INDEX: 28.41 KG/M2 | OXYGEN SATURATION: 93 % | HEIGHT: 67 IN | SYSTOLIC BLOOD PRESSURE: 134 MMHG | HEART RATE: 78 BPM | DIASTOLIC BLOOD PRESSURE: 76 MMHG

## 2025-06-03 DIAGNOSIS — M25.562 LEFT KNEE PAIN, UNSPECIFIED CHRONICITY: Primary | ICD-10-CM

## 2025-06-03 DIAGNOSIS — M17.12 OSTEOARTHRITIS OF LEFT KNEE, UNSPECIFIED OSTEOARTHRITIS TYPE: ICD-10-CM

## 2025-06-03 RX ORDER — DICLOFENAC SODIUM 75 MG/1
75 TABLET, DELAYED RELEASE ORAL 2 TIMES DAILY
Qty: 60 TABLET | Refills: 1 | Status: SHIPPED | OUTPATIENT
Start: 2025-06-03

## 2025-06-03 RX ORDER — TRIAMCINOLONE ACETONIDE 40 MG/ML
40 INJECTION, SUSPENSION INTRA-ARTICULAR; INTRAMUSCULAR
Status: COMPLETED | OUTPATIENT
Start: 2025-06-03 | End: 2025-06-03

## 2025-06-03 RX ORDER — LIDOCAINE HYDROCHLORIDE 10 MG/ML
5 INJECTION, SOLUTION INFILTRATION; PERINEURAL
Status: COMPLETED | OUTPATIENT
Start: 2025-06-03 | End: 2025-06-03

## 2025-06-03 RX ADMIN — TRIAMCINOLONE ACETONIDE 40 MG: 40 INJECTION, SUSPENSION INTRA-ARTICULAR; INTRAMUSCULAR at 14:43

## 2025-06-03 RX ADMIN — LIDOCAINE HYDROCHLORIDE 5 ML: 10 INJECTION, SOLUTION INFILTRATION; PERINEURAL at 14:43

## 2025-06-03 NOTE — PROGRESS NOTES
"Chief Complaint  Initial Evaluation of the Left Knee     Subjective      Maggie Guzmán presents to [facility] for initial evaluation of the left knee.  My knee is killing me.  She went to ED 5/25/25 and had X rays and is here to review.  She was put on a couple of medications.  She is still having pain in the knee and can barely weight bear.  She has no recent injury or fall.      Allergies   Allergen Reactions    Adhesive Tape Hives    Codeine Unknown - High Severity and Other (See Comments)    Latex Hives    Metformin Unknown - Low Severity     Caused infection in her pancreas    Penicillins Unknown - High Severity and Rash        Social History     Socioeconomic History    Marital status:    Tobacco Use    Smoking status: Some Days     Types: Cigarettes    Smokeless tobacco: Never   Vaping Use    Vaping status: Never Used   Substance and Sexual Activity    Alcohol use: Never    Drug use: Never    Sexual activity: Defer        I reviewed the patient's chief complaint, history of present illness, review of systems, past medical history, surgical history, family history, social history, medications, and allergy list.     Review of Systems     Constitutional: Denies fevers, chills, weight loss  Cardiovascular: Denies chest pain, shortness of breath  Skin: Denies rashes, acute skin changes  Neurologic: Denies headache, loss of consciousness        Vital Signs:   /76   Pulse 78   Ht 170.2 cm (67\")   Wt 82.1 kg (181 lb)   SpO2 93%   BMI 28.35 kg/m²          Physical Exam  General: Alert. No acute distress    Ortho Exam        LEFT KNEE Flexion 110. Extension -5. Stable to varus/valgus stress. Stable to anterior/posterior drawer. Neurovascularly intact. Negative Sam. Negative Lachman. Positive EHL, FHL, HS and TA. Sensation intact to light touch all 5 nerves of the foot. Ambulates with Antalgic gait. Patella is well tracking. Calf supple, non-tender. Positive " tenderness to the medial joint line. Positive tenderness to the lateral joint line. Positive Crepitus.   Knee Extensor Mechanism intact  Mild effusion and swelling.        Large Joint Arthrocentesis: L knee  Date/Time: 6/3/2025 2:43 PM  Consent given by: patient  Site marked: site marked  Timeout: Immediately prior to procedure a time out was called to verify the correct patient, procedure, equipment, support staff and site/side marked as required   Supporting Documentation  Indications: pain   Procedure Details  Location: knee - L knee  Preparation: Patient was prepped and draped in the usual sterile fashion  Needle gauge: 21 G.  Medications administered: 5 mL lidocaine 1 %; 40 mg triamcinolone acetonide 40 MG/ML  Patient tolerance: patient tolerated the procedure well with no immediate complications    This injection documentation was Scribed for Briseida Higginbotham MD by KARI Carroll.  06/03/25   14:43 EDT      Imaging Results (Most Recent)       None             Result Review :         XR Knee 3 View Left  Result Date: 5/25/2025  Narrative: XR KNEE 3 VW LEFT Date of Exam: 5/25/2025 6:04 PM EDT Indication: pain and fall pain Comparison: None available. Findings: No fracture. No bony abnormality. No joint space loss or arthritic change. Suspect small amount of fluid in the suprapatellar bursa     Impression: Impression: 1. No evidence of fracture. 2. Suspect small joint effusion Electronically Signed: Curt Orlando  5/25/2025 6:21 PM EDT  Workstation ID: [id number]             Assessment and Plan     Diagnoses and all orders for this visit:    1. Left knee pain, unspecified chronicity (Primary)    2. Osteoarthritis of left knee, unspecified osteoarthritis type        Discussed the treatment plan with the patient. I reviewed the X-rays that were obtained 5/25/25 with the patient.     Discussed the risks and benefits of conservative measures. The patient expressed understanding and wished to proceed with a left  knee steroid injection.  She tolerated the injection well.      Discussed with the patient that due to the steroid injection given today in the office they may see an increase in blood sugar for a few days. Advised patient to monitor sugar after receiving the injection.     Discussed possibility of a reaction from the injection.  Discussed the possibility that the injection may not completely improve or remove the pain.  Discussed the risk of infection.    Prescribed anti inflammatory.      Educated on risk of smoking/nicotine. Discussed options for smoking cessation regarding chantix, nicorette gum and/ or to call the quit hotline at [phone]  and Call or return if worsening symptoms.    Follow Up     PRN      Patient was given instructions and counseling regarding her condition or for health maintenance advice. Please see specific information pulled into the AVS if appropriate.     Scribed for Briseida Higginbotham MD by Geri Batres MA.  06/03/25   14:25 EDT    I have personally performed the services described in this document as scribed by the above individual and it is both accurate and complete. Briseida Higginbotham MD 06/03/25

## 2025-07-01 ENCOUNTER — OFFICE VISIT (OUTPATIENT)
Dept: ORTHOPEDIC SURGERY | Facility: CLINIC | Age: 65
End: 2025-07-01
Payer: MEDICARE

## 2025-07-01 VITALS
DIASTOLIC BLOOD PRESSURE: 75 MMHG | OXYGEN SATURATION: 95 % | SYSTOLIC BLOOD PRESSURE: 149 MMHG | WEIGHT: 181 LBS | BODY MASS INDEX: 28.41 KG/M2 | HEART RATE: 79 BPM | HEIGHT: 67 IN

## 2025-07-01 DIAGNOSIS — M17.12 OSTEOARTHRITIS OF LEFT KNEE, UNSPECIFIED OSTEOARTHRITIS TYPE: Primary | ICD-10-CM

## 2025-07-01 DIAGNOSIS — M25.562 LEFT KNEE PAIN, UNSPECIFIED CHRONICITY: ICD-10-CM

## 2025-07-01 PROCEDURE — 1160F RVW MEDS BY RX/DR IN RCRD: CPT | Performed by: PHYSICIAN ASSISTANT

## 2025-07-01 PROCEDURE — 99213 OFFICE O/P EST LOW 20 MIN: CPT | Performed by: PHYSICIAN ASSISTANT

## 2025-07-01 PROCEDURE — 1159F MED LIST DOCD IN RCRD: CPT | Performed by: PHYSICIAN ASSISTANT

## 2025-07-01 NOTE — PROGRESS NOTES
Chief Complaint  Follow-up of the Left Knee     Subjective        History of Present Illness  The patient is a 64-year-old female who presents for a follow-up on her left knee. She had her initial evaluation on 06/03/2025 with Dr. Higginbotham and was given a steroid injection. She is here to discuss the efficacy of the injection.    She was last seen on 06/03/2025, during which x-rays were obtained that were essentially normal. Diclofenac was prescribed at that time.  Steroid injection at last visit provided relief for about a week and then it recurred.  The diclofenac caused stomach upset so she had to stop it.  She reports no recent falls or injuries but mentions that she is very active and cannot sit still.  Her knee pain began spontaneously. The pain is localized around the lateral aspect of her knee and does not radiate to her hip.  She does report low back pain issues.  She recalls an incident where she could exert pressure behind her knee. She has previously tried physical therapy for her back issues, but it was ineffective he is not eager to return for her knee.     She has a history of back issues since she was 15 years old, which she attributes to playing basketball from seventh grade through her senior year. She received a steroid injection this past week for her curved spine, which provided relief in the knee for about 2 days.    SOCIAL HISTORY  Exercise: Engages in activities such as yard work and housework regularly.      Allergies   Allergen Reactions    Adhesive Tape Hives    Codeine Unknown - High Severity and Other (See Comments)    Latex Hives    Metformin Unknown - Low Severity     Caused infection in her pancreas    Penicillins Unknown - High Severity and Rash        Social History     Socioeconomic History    Marital status:    Tobacco Use    Smoking status: Some Days     Types: Cigarettes    Smokeless tobacco: Never   Vaping Use    Vaping status: Never Used   Substance and Sexual Activity     "Alcohol use: Never    Drug use: Never    Sexual activity: Defer        Tobacco Use: High Risk (7/1/2025)    Patient History     Smoking Tobacco Use: Some Days     Smokeless Tobacco Use: Never     Passive Exposure: Not on file     Patient reports tobacco use. Tobacco use can delay healing and complicate the recovery process. Recommended tobacco cessation for optimal healing and health benefits.     I reviewed the patient's chief complaint, history of present illness, review of systems, past medical history, surgical history, family history, social history, medications, and allergy list.     Review of Systems     Constitutional: Denies fevers, chills, weight loss  Cardiovascular: Denies chest pain, shortness of breath  Skin: Denies rashes, acute skin changes  Neurologic: Denies headache, loss of consciousness    Vital Signs:   /75   Pulse 79   Ht 170.2 cm (67\")   Wt 82.1 kg (181 lb)   SpO2 95%   BMI 28.35 kg/m²          Physical Exam  General: Alert. No acute distress    Ortho Exam    Left knee: Mild swelling.  Knee extensor mechanism intact with knee stable to varus and valgus stress.  Negative Lachman.  No pain with Sam.  Range of motion is -2 to about 110 degrees.  She does have lateral joint tenderness.  She also reports tenderness along the lateral aspect of the knee near the IT band insertion site.  Calf is soft and nontender.  Demonstrates full ankle plantarflexion/dorsiflexion.  Sensation neurovascularly intact.  Physical Exam          Assessment and Plan     Diagnoses and all orders for this visit:    1. Osteoarthritis of left knee, unspecified osteoarthritis type (Primary)    2. Left knee pain, unspecified chronicity  -     MRI Knee Left Without Contrast; Future        Assessment & Plan  1. Left knee pain:  X-ray noted mild arthritis although these were not weightbearing.    Given the location of her pain, it seems more related to IT band versus potential internal derangement although she has " no direct injury.  She is tender on the lateral joint space.    We discussed physical therapy.  Patient states she has always been very active and is comfortable doing home exercises and inquires if she can try that first.  Home exercises provided for IT band tendinitis I like her to do those for a few weeks and if they are unhelpful then she can do the knee exercises that were provided instead.    MRI was discussed.  She would like to see if she can go ahead and get this as it was discussed at last visit.  MRI of the left knee has been ordered.    Like to see her back 1 week after the MRI to review the results.      Patient or patient representative verbalized consent for the use of Ambient Listening during the visit with  Vanda Camara PA-C for chart documentation. 7/1/2025  17:11 EDT    Follow Up   There are no Patient Instructions on file for this visit.        Patient was given instructions and counseling regarding her condition or for health maintenance advice. Please see specific information pulled into the AVS if appropriate.     Vanda Camara PA-C   07/01/2025  17:07 EDT        Dictated Utilizing Dragon Dictation. Please note that portions of this note were completed with a voice recognition program. Part of this note may be an electronic transcription/translation of spoken language to printed text using the Dragon Dictation System.

## 2025-07-09 ENCOUNTER — CLINICAL SUPPORT (OUTPATIENT)
Dept: FAMILY MEDICINE CLINIC | Facility: CLINIC | Age: 65
End: 2025-07-09
Payer: MEDICARE

## 2025-07-09 DIAGNOSIS — R53.83 FATIGUE, UNSPECIFIED TYPE: ICD-10-CM

## 2025-07-09 DIAGNOSIS — D50.0 IRON DEFICIENCY ANEMIA DUE TO CHRONIC BLOOD LOSS: ICD-10-CM

## 2025-07-09 DIAGNOSIS — D72.829 LEUKOCYTOSIS, UNSPECIFIED TYPE: ICD-10-CM

## 2025-07-09 LAB
BASOPHILS # BLD AUTO: 0.11 10*3/MM3 (ref 0–0.2)
BASOPHILS NFR BLD AUTO: 0.8 % (ref 0–1.5)
DEPRECATED RDW RBC AUTO: 58.5 FL (ref 37–54)
EOSINOPHIL # BLD AUTO: 0.13 10*3/MM3 (ref 0–0.4)
EOSINOPHIL NFR BLD AUTO: 0.9 % (ref 0.3–6.2)
ERYTHROCYTE [DISTWIDTH] IN BLOOD BY AUTOMATED COUNT: 16.2 % (ref 12.3–15.4)
FERRITIN SERPL-MCNC: 45.04 NG/ML (ref 13–150)
FOLATE SERPL-MCNC: 16 NG/ML (ref 4.78–24.2)
HCT VFR BLD AUTO: 40.4 % (ref 34–46.6)
HGB BLD-MCNC: 12.7 G/DL (ref 12–15.9)
IMM GRANULOCYTES # BLD AUTO: 0.07 10*3/MM3 (ref 0–0.05)
IMM GRANULOCYTES NFR BLD AUTO: 0.5 % (ref 0–0.5)
IRON 24H UR-MRATE: 45 MCG/DL (ref 37–145)
IRON SATN MFR SERPL: 10 % (ref 20–50)
LYMPHOCYTES # BLD AUTO: 1.49 10*3/MM3 (ref 0.7–3.1)
LYMPHOCYTES NFR BLD AUTO: 10.8 % (ref 19.6–45.3)
MCH RBC QN AUTO: 30.8 PG (ref 26.6–33)
MCHC RBC AUTO-ENTMCNC: 31.4 G/DL (ref 31.5–35.7)
MCV RBC AUTO: 98.1 FL (ref 79–97)
MONOCYTES # BLD AUTO: 0.97 10*3/MM3 (ref 0.1–0.9)
MONOCYTES NFR BLD AUTO: 7 % (ref 5–12)
NEUTROPHILS NFR BLD AUTO: 10.99 10*3/MM3 (ref 1.7–7)
NEUTROPHILS NFR BLD AUTO: 80 % (ref 42.7–76)
NRBC BLD AUTO-RTO: 0 /100 WBC (ref 0–0.2)
PLATELET # BLD AUTO: 528 10*3/MM3 (ref 140–450)
PMV BLD AUTO: 10 FL (ref 6–12)
RBC # BLD AUTO: 4.12 10*6/MM3 (ref 3.77–5.28)
TIBC SERPL-MCNC: 456 MCG/DL (ref 298–536)
TRANSFERRIN SERPL-MCNC: 306 MG/DL (ref 200–360)
VIT B12 BLD-MCNC: 440 PG/ML (ref 211–946)
WBC NRBC COR # BLD AUTO: 13.76 10*3/MM3 (ref 3.4–10.8)

## 2025-07-09 PROCEDURE — 36415 COLL VENOUS BLD VENIPUNCTURE: CPT | Performed by: NURSE PRACTITIONER

## 2025-07-09 PROCEDURE — 82607 VITAMIN B-12: CPT | Performed by: INTERNAL MEDICINE

## 2025-07-09 PROCEDURE — 85025 COMPLETE CBC W/AUTO DIFF WBC: CPT | Performed by: INTERNAL MEDICINE

## 2025-07-09 PROCEDURE — 82728 ASSAY OF FERRITIN: CPT | Performed by: INTERNAL MEDICINE

## 2025-07-09 PROCEDURE — 83540 ASSAY OF IRON: CPT | Performed by: INTERNAL MEDICINE

## 2025-07-09 PROCEDURE — 82746 ASSAY OF FOLIC ACID SERUM: CPT | Performed by: INTERNAL MEDICINE

## 2025-07-09 PROCEDURE — 84466 ASSAY OF TRANSFERRIN: CPT | Performed by: INTERNAL MEDICINE

## 2025-07-09 NOTE — PROGRESS NOTES
Venipuncture Blood Specimen Collection  Venipuncture performed in left arm by Josephine Carballo with good hemostasis. Patient tolerated the procedure well without complications.   07/09/25   Josephine Carballo

## 2025-07-11 ENCOUNTER — OFFICE VISIT (OUTPATIENT)
Dept: ONCOLOGY | Facility: HOSPITAL | Age: 65
End: 2025-07-11
Payer: MEDICARE

## 2025-07-11 VITALS
HEIGHT: 67 IN | RESPIRATION RATE: 18 BRPM | SYSTOLIC BLOOD PRESSURE: 149 MMHG | OXYGEN SATURATION: 97 % | TEMPERATURE: 98.1 F | BODY MASS INDEX: 34.25 KG/M2 | HEART RATE: 65 BPM | WEIGHT: 218.2 LBS | DIASTOLIC BLOOD PRESSURE: 74 MMHG

## 2025-07-11 DIAGNOSIS — D50.0 IRON DEFICIENCY ANEMIA DUE TO CHRONIC BLOOD LOSS: ICD-10-CM

## 2025-07-11 DIAGNOSIS — D72.829 LEUKOCYTOSIS, UNSPECIFIED TYPE: Primary | ICD-10-CM

## 2025-07-11 DIAGNOSIS — R79.89 LFT ELEVATION: ICD-10-CM

## 2025-07-11 NOTE — PROGRESS NOTES
Chief Complaint/Reason for Referral:  leukocytosis    Maxine Trammell MD King, Anne L., MD    Records Obtained:  Records of the patients history including those obtained from [facility] and patient information were reviewed and summarized in detail.    Subjective    History of Present Illness  The patient presents for follow-up regarding leukocytosis. She was last seen by Dr. Frazier in 04/2025. Her past medical history includes diabetes, thyroid issues, and receiving steroid injections every four months for pain control. She has had chronic leukocytosis since at least 11/2019, with white blood cell counts ranging from 12.71 to 16.2, likely reactive and possibly secondary to chronic tobacco use. Previous flow cytometry indicated likely reactive CD56 expression. Recent lab work on 07/09/2025 showed a white blood cell count of 13.76, platelet count of 528, and MCV of 98. Her white blood cell count has fluctuated between 11.61 and a maximum of 15.92 in 10/2024. Platelet counts have been elevated since 10/2024. Absolute neutrophilia has been noted.    She reports no weight loss, night sweats, or fevers. There has been no abnormal bleeding, except for minor cuts on her arm. An upcoming appointment with a gastroenterologist for a liver MRI is scheduled. Steroid injections for pain management continue to be administered. A bone marrow biopsy performed years ago yielded normal results.    She has a history of smoking but quit approximately 3 to 4 years ago.    An increase in blood sugar levels has been noticed, which she attributes to the steroid injections and pain medications prescribed for her knee.    PAST SURGICAL HISTORY:  She underwent a colonoscopy, which resulted in a significant scar.     SOCIAL HISTORY  The patient is a former smoker, having quit about 3 to 4 years ago.    Results  Labs   - White blood cell count: 07/09/2025, 13.76   - Platelet count: 07/09/2025, 528   - MCV: 07/09/2025, 98   - Iron saturation:  04/2025, 10   - Iron saturation: 10   - Ferritin: Normal       Oncology/Hematology History    No history exists.       Review of Systems   Constitutional:  Positive for fatigue. Negative for appetite change, diaphoresis, fever, unexpected weight gain and unexpected weight loss.   HENT:  Negative for hearing loss, sore throat and voice change.    Eyes:  Negative for blurred vision, double vision, pain, redness and visual disturbance.   Respiratory:  Negative for cough, shortness of breath and wheezing.    Cardiovascular:  Negative for chest pain, palpitations and leg swelling.   Endocrine: Negative for cold intolerance, heat intolerance, polydipsia and polyuria.   Genitourinary:  Negative for decreased urine volume, difficulty urinating, frequency and urinary incontinence.   Musculoskeletal:  Negative for arthralgias, back pain, joint swelling and myalgias.   Skin:  Negative for color change, rash, skin lesions and wound.   Neurological:  Negative for dizziness, seizures, numbness and headache.   Hematological:  Negative for adenopathy. Does not bruise/bleed easily.   Psychiatric/Behavioral:  Negative for depressed mood. The patient is not nervous/anxious.        Current Outpatient Medications on File Prior to Visit   Medication Sig Dispense Refill    albuterol sulfate  (90 Base) MCG/ACT inhaler       atorvastatin (LIPITOR) 80 MG tablet Take 1 tablet by mouth Daily. (Patient taking differently: Take 0.5 tablets by mouth Daily.)      baclofen (LIORESAL) 10 MG tablet Take 1 tablet by mouth 3 (Three) Times a Day As Needed for Muscle Spasms. 21 tablet 0    Blood Glucose Monitoring Suppl (OneTouch Verio Reflect) w/Device kit       diclofenac (VOLTAREN) 75 MG EC tablet Take 1 tablet by mouth 2 (Two) Times a Day. 60 tablet 1    ezetimibe (ZETIA) 10 MG tablet       ferrous gluconate (FERGON) 324 MG tablet Take 1 tablet by mouth Daily With Breakfast. 90 tablet 1    glucagon (GlucaGen HypoKit) 1 MG injection        glucose blood test strip OneTouch Verio test strips      Insulin Lispro (HumaLOG) 100 UNIT/ML solution cartridge Humalog 100 unit/mL subcutaneous cartridge inject by subcutaneous route per prescriber's instructions. Insulin dosing requires individualization.   Active      Lancets (OneTouch Delica Plus [id number]) misc       Lantus SoloStar 100 UNIT/ML injection pen       Levemir FlexPen 100 UNIT/ML injection 22u am and 32u pm      levothyroxine (SYNTHROID, LEVOTHROID) 100 MCG tablet Take 1 tablet by mouth Daily.      metFORMIN (GLUCOPHAGE) 500 MG tablet Take 1 tablet by mouth 2 (Two) Times a Day.      OneTouch Verio test strip       Olumiant 4 MG tablet        No current facility-administered medications on file prior to visit.       Allergies   Allergen Reactions    Adhesive Tape Hives    Codeine Unknown - High Severity and Other (See Comments)    Latex Hives    Metformin Unknown - Low Severity     Caused infection in her pancreas    Penicillins Unknown - High Severity and Rash     Past Medical History:   Diagnosis Date    Diabetes mellitus      Past Surgical History:   Procedure Laterality Date    ADENOIDECTOMY      TONSILLECTOMY       Social History     Socioeconomic History    Marital status:    Tobacco Use    Smoking status: Some Days     Types: Cigarettes    Smokeless tobacco: Never   Vaping Use    Vaping status: Never Used   Substance and Sexual Activity    Alcohol use: Never    Drug use: Never    Sexual activity: Defer     Family History   Problem Relation Age of Onset    Breast cancer Maternal Grandmother      Immunization History   Administered Date(s) Administered    COVID-19 (MODERNA) 1st,2nd,3rd Dose Monovalent 04/17/2021, 05/15/2021, 12/01/2021    COVID-19 (PFIZER) 12YRS+ (COMIRNATY) 09/04/2024    COVID-19 (PFIZER) BIVALENT 12+YRS 10/27/2022    Flublok 18+yrs 10/09/2021, 10/27/2022    Fluzone  >6mos 11/29/2010, 10/11/2011, 11/02/2012, 11/09/2013, 10/17/2014    Fluzone (or Fluarix & Flulaval for  "VFC) >6mos 09/21/2015    Fluzone High-Dose 65+YRS 09/04/2024    Shingrix 12/24/2020    Tdap 01/30/2018       Tobacco Use: High Risk (7/11/2025)    Patient History     Smoking Tobacco Use: Some Days     Smokeless Tobacco Use: Never     Passive Exposure: Not on file       Objective     Physical Exam  Vitals and nursing note reviewed.   Constitutional:       Appearance: Normal appearance.   HENT:      Mouth/Throat:      Mouth: Mucous membranes are moist.   Eyes:      Extraocular Movements: Extraocular movements intact.   Cardiovascular:      Rate and Rhythm: Normal rate.   Pulmonary:      Effort: Pulmonary effort is normal. No respiratory distress.   Musculoskeletal:         General: Normal range of motion.      Cervical back: Normal range of motion and neck supple.   Skin:     General: Skin is warm and dry.   Neurological:      General: No focal deficit present.      Mental Status: She is alert and oriented to person, place, and time.   Psychiatric:         Mood and Affect: Mood normal.         Behavior: Behavior normal.         Thought Content: Thought content normal.         Judgment: Judgment normal.         Vitals:    07/11/25 0913   BP: 149/74   Pulse: 65   Resp: 18   Temp: 98.1 °F (36.7 °C)   TempSrc: Oral   SpO2: 97%   Weight: 99 kg (218 lb 3.2 oz)   Height: 170.2 cm (67\")   PainSc: 0-No pain       Wt Readings from Last 3 Encounters:   07/11/25 99 kg (218 lb 3.2 oz)   07/01/25 82.1 kg (181 lb)   06/03/25 82.1 kg (181 lb)        ECOG score: 0         ECOG: (0) Fully Active - Able to Carry On All Pre-disease Performance Without Restriction  Fall Risk Assessment was completed, and patient is at low risk for falls.  PHQ-9 Total Score:         The patient is  experiencing fatigue. Fatigue score: 9    PT/OT Functional Screening: PT fx screen : No needs identified  Speech Functional Screening: Speech fx screen : No needs identified  Rehab to be ordered: Rehab to be ordered : No needs identified        Result Review " ":  The following data was reviewed by: RYANN Hays on 07/11/2025:  Lab Results   Component Value Date    HGB 12.7 07/09/2025    HCT 40.4 07/09/2025    MCV 98.1 (H) 07/09/2025     (H) 07/09/2025    WBC 13.76 (H) 07/09/2025    NEUTROABS 10.99 (H) 07/09/2025    LYMPHSABS 1.49 07/09/2025    MONOSABS 0.97 (H) 07/09/2025    EOSABS 0.13 07/09/2025    BASOSABS 0.11 07/09/2025     Lab Results   Component Value Date    GLUCOSE 320 (H) 07/03/2024    BUN 11 07/03/2024    CREATININE 0.96 07/03/2024     07/03/2024    K 4.5 07/03/2024     07/03/2024    CO2 25.8 07/03/2024    CALCIUM 9.5 07/03/2024    PROTEINTOT 6.3 07/03/2024    ALBUMIN 4.1 07/03/2024    BILITOT 0.4 07/03/2024    ALKPHOS 83 07/03/2024    AST 22 07/03/2024    ALT 21 07/03/2024     Lab Results   Component Value Date    Ferritin 45.04 07/09/2025    Folate 16.00 07/09/2025     Lab Results   Component Value Date    IRON 45 07/09/2025    LABIRON 10 (L) 07/09/2025    TRANSFERRIN 306 07/09/2025    TIBC 456 07/09/2025     Lab Results   Component Value Date    FERRITIN 45.04 07/09/2025    KEOBGHMK39 440 07/09/2025    FOLATE 16.00 07/09/2025     No results found for: \"PSA\", \"CEA\", \"AFP\", \"\", \"\"         Assessment and Plan:  Diagnoses and all orders for this visit:    1. Leukocytosis, unspecified type (Primary)  -     CBC & Differential; Future  -     Comprehensive Metabolic Panel; Future  -     Iron Profile; Future  -     Ferritin; Future    2. Iron deficiency anemia due to chronic blood loss  -     CBC & Differential; Future  -     Comprehensive Metabolic Panel; Future  -     Iron Profile; Future  -     Ferritin; Future    3. LFT elevation  -     CBC & Differential; Future  -     Comprehensive Metabolic Panel; Future  -     Iron Profile; Future  -     Ferritin; Future        Assessment & Plan  1. Leukocytosis.  Leukocytosis is likely reactive, possibly due to chronic tobacco use or steroid injections, diabetes.  The condition is " not worsening, which is reassuring. Her white blood cell count has been elevated since November 2019, ranging from 11.61 to a maximum of 15.92 in October 2024. Recent lab work on 07/09/2025 showed a white blood cell count of 13.76. A bone marrow biopsy will be considered if her white blood cell count worsens.    2. Iron deficiency.  There may be mild iron deficiency, as indicated by an iron saturation of 10, which was also recorded in April 2025. However, there is no evidence of anemia on her CBC. Iron supplements are recommended three days a week.    3. Elevated platelet count.  The platelet count has been slightly elevated since October 2024, with a recent count of 528. This could be due to inflammation or iron deficiency.    4. Diabetes.  Blood sugar levels have been affected by steroid injections and pain medications. Continued monitoring of blood sugar levels is advised.    5. Former smoker.  Smoking cessation occurred three to four years ago. Continued abstinence from smoking is encouraged.    Follow-up  A follow-up visit is scheduled in four months for lab recheck.        I spent 30 minutes caring for Maggie on this date of service. This time includes time spent by me in the following activities:preparing for the visit, reviewing tests, obtaining and/or reviewing a separately obtained history, performing a medically appropriate examination and/or evaluation , counseling and educating the patient/family/caregiver, ordering medications, tests, or procedures, documenting information in the medical record, and independently interpreting results and communicating that information with the patient/family/caregiver      Patient was given instructions and counseling regarding her condition or for health maintenance advice. Please see specific information pulled into the AVS if appropriate.     Brii Ag, APRN    7/11/2025    .[email or web address]    Patient or patient representative verbalized consent for the use of  Ambient Listening during the visit with  RYANN Hays for chart documentation. 7/11/2025  10:36 EDT

## 2025-07-28 ENCOUNTER — HOSPITAL ENCOUNTER (OUTPATIENT)
Dept: MRI IMAGING | Facility: HOSPITAL | Age: 65
Discharge: HOME | End: 2025-07-28
Admitting: PHYSICIAN ASSISTANT
Payer: MEDICARE

## 2025-07-28 DIAGNOSIS — M25.562 LEFT KNEE PAIN, UNSPECIFIED CHRONICITY: ICD-10-CM

## 2025-07-28 PROCEDURE — 73721 MRI JNT OF LWR EXTRE W/O DYE: CPT

## 2025-07-31 ENCOUNTER — OFFICE VISIT (OUTPATIENT)
Dept: GASTROENTEROLOGY | Facility: CLINIC | Age: 65
End: 2025-07-31
Payer: MEDICARE

## 2025-07-31 VITALS
SYSTOLIC BLOOD PRESSURE: 149 MMHG | DIASTOLIC BLOOD PRESSURE: 62 MMHG | HEART RATE: 69 BPM | HEIGHT: 67 IN | WEIGHT: 218 LBS | BODY MASS INDEX: 34.21 KG/M2

## 2025-07-31 DIAGNOSIS — B15.9 VIRAL HEPATITIS A WITHOUT HEPATIC COMA: ICD-10-CM

## 2025-07-31 DIAGNOSIS — Z12.11 ENCOUNTER FOR SCREENING FOR MALIGNANT NEOPLASM OF COLON: ICD-10-CM

## 2025-07-31 DIAGNOSIS — K27.9 PEPTIC ULCER DISEASE: ICD-10-CM

## 2025-07-31 DIAGNOSIS — E88.89 OTHER SPECIFIED METABOLIC DISORDERS: ICD-10-CM

## 2025-07-31 DIAGNOSIS — R74.8 ELEVATED LIVER ENZYMES: Primary | ICD-10-CM

## 2025-07-31 DIAGNOSIS — K57.90 DIVERTICULOSIS: ICD-10-CM

## 2025-07-31 DIAGNOSIS — A04.8 HELICOBACTER PYLORI INFECTION: ICD-10-CM

## 2025-07-31 DIAGNOSIS — R79.9 ABNORMAL FINDING OF BLOOD CHEMISTRY, UNSPECIFIED: ICD-10-CM

## 2025-07-31 RX ORDER — PEG-3350, SODIUM SULFATE, SODIUM CHLORIDE, POTASSIUM CHLORIDE, SODIUM ASCORBATE AND ASCORBIC ACID 7.5-2.691G
1000 KIT ORAL EVERY 12 HOURS
Qty: 2000 ML | Refills: 0 | Status: SHIPPED | OUTPATIENT
Start: 2025-07-31 | End: 2025-08-01 | Stop reason: SDUPTHER

## 2025-07-31 NOTE — PROGRESS NOTES
Chief Complaint        New Patient and Elevated Hepatic Enzymes    History of Present Illness      Maggie Guzmán is a 64 y.o. female who presents to [facility] as a new patient       History of Present Illness  The patient is a 64-year-old female who presents today as a new patient for elevated liver enzymes.    Recent blood work on 07/09/2025 showed elevated liver enzymes, specifically AST at 38 and ALT at 35, while alkaline phosphatase and total bilirubin were normal. A hepatic panel indicated reactive hepatitis A antibodies. She had a CT scan of the abdomen and pelvis in 09/2023 for abdominal pain, which revealed diffuse diverticulosis, mild edematous wall thickening of the distal antral pyloric region of the stomach, and a liver cyst.    She underwent an EGD with Dr. Yanez in 07/2018 for epigastric pain, peptic ulcer disease, and H. pylori, which revealed a healing ulcer at that time. Her last colonoscopy was performed on 12/12/2012 by Dr. Yanez, noting diverticulosis and a possible area of persistent diverticulitis.    She has type 1 diabetes and is struggling to lower her A1c levels, which is currently at 8.7. Despite efforts, she has been unable to reduce it below seven. She has not been informed about the potential benefits of medications like Ozempic or Wegovy for weight loss and diabetes management. She reports not consuming large amounts of food but has been gaining weight.    Her bowel movements are irregular, and she occasionally uses Pepto-Bismol for relief. She sometimes feels incomplete bowel evacuation.    She does not regularly see a cardiologist or pulmonologist and is not on any blood thinners. She has no known history of high cholesterol or high blood pressure.    PAST SURGICAL HISTORY:  EGD in 07/2018  Colonoscopy in 12/2012    SOCIAL HISTORY  Alcohol: Does not drink alcohol.    FAMILY HISTORY  - Grandmother: Cancer, specific type unknown  - Negative for  "family history of stomach, throat, liver, or pancreatic cancer  - Negative for family history of high cholesterol  - Mother:  of natural causes  - Father:  of natural causes  - Grandfather:  in the       Results       Result Review :   The following data was reviewed by: RYANN David on 2025       CBC          10/7/2024    09:11 2025    13:39 2025    11:25   CBC   WBC 15.92  12.10  13.76    RBC 4.13  3.96  4.12    Hemoglobin 12.6  11.8  12.7    Hematocrit 39.9  37.2  40.4    MCV 96.6  93.9  98.1    MCH 30.5  29.8  30.8    MCHC 31.6  31.7  31.4    RDW 14.5  15.3  16.2    Platelets 516  515  528      CBC w/diff          10/7/2024    09:11 2025    13:39 2025    11:25   CBC w/Diff   WBC 15.92  12.10  13.76    RBC 4.13  3.96  4.12    Hemoglobin 12.6  11.8  12.7    Hematocrit 39.9  37.2  40.4    MCV 96.6  93.9  98.1    MCH 30.5  29.8  30.8    MCHC 31.6  31.7  31.4    RDW 14.5  15.3  16.2    Platelets 516  515  528    Neutrophil Rel % 72.3  60.2  80.0    Immature Granulocyte Rel % 0.4  0.3  0.5    Lymphocyte Rel % 17.3  29.3  10.8    Monocyte Rel % 7.5  7.8  7.0    Eosinophil Rel % 1.5  1.6  0.9    Basophil Rel % 1.0  0.8  0.8      Lipid Panel          2025    13:04   Lipid Panel   Total Cholesterol 209       Triglycerides 120       HDL Cholesterol 90       LDL Cholesterol  97          Details          This result is from an external source.             TSH          3/15/2025    08:27 2025    13:04   TSH   TSH 2.73     2.81          Details          This result is from an external source.                         Lipase   Lipase   Date Value Ref Range Status   09/15/2023 84 (H) 13 - 60 U/L Final     Amylase No results found for: \"AMYLASE\"  Iron Profile   Iron   Date Value Ref Range Status   2025 45 37 - 145 mcg/dL Final     TIBC   Date Value Ref Range Status   2025 456 298 - 536 mcg/dL Final     Iron Saturation (TSAT)   Date Value Ref Range Status " "  07/09/2025 10 (L) 20 - 50 % Final     Transferrin   Date Value Ref Range Status   07/09/2025 306 200 - 360 mg/dL Final     Ferritin   Ferritin   Date Value Ref Range Status   07/09/2025 45.04 13.00 - 150.00 ng/mL Final     ESR (Sed Rate) No results found for: \"SEDRATE\"  CRP (C-Reactive) No results found for: \"CRP\"  Liver Workup   Ferritin   Date Value Ref Range Status   07/09/2025 45.04 13.00 - 150.00 ng/mL Final     Iron   Date Value Ref Range Status   07/09/2025 45 37 - 145 mcg/dL Final     TIBC   Date Value Ref Range Status   07/09/2025 456 298 - 536 mcg/dL Final     Iron Saturation (TSAT)   Date Value Ref Range Status   07/09/2025 10 (L) 20 - 50 % Final     Transferrin   Date Value Ref Range Status   07/09/2025 306 200 - 360 mg/dL Final     Protime   Date Value Ref Range Status   11/12/2019 12.0 10.3 - 13.3 s Final     Comment:     (note)  Anticoagulants may alter the results of Laboratory   Coagulation assays. New-generation anticoagulants such as   direct Thrombin inhibitors (Dabigatran/Pradaxa, Argatroban,   Bivalrudin) and direct/indirect factor Xa inhibitors   (Rivaroxaban/Xarelto,Apixaban/Eliquis, Danaparoid/Orgaran,   Fondaparinux/Arixtra) have been shown to affect the results   of Laboratory Coagulation assays, creating falsely elevated   or decreased values. Correlation with medication history is   advised.     INR   Date Value Ref Range Status   11/12/2019 1.0 INR Final     Comment:     INR Therapeutic Ranges:  Low Intensity Range: 2.0-3.0  High Intensity Range:  3.0-4.5     Acute HEP Panel   External Hepatitis C Ab   Date Value Ref Range Status   07/17/2025 NON-REACTIVE NON-REACTIVE Final     Comment:       HCV antibody was non-reactive. There is no laboratory   evidence of HCV infection.    In most cases, no further action is required. However,  if recent HCV exposure is suspected, a test for HCV RNA  (test code 11798) is suggested.    For additional information please refer " "to  http://education.[employer]/faq/OJO53b9  (This link is being provided for informational/  educational purposes only.)     Alpha 1 No results found for: \"AFPTM\"  PRICILA No results found for: \"DSDNA\", \"EXPANDEDENA\"  ASMA No results found for: \"SMOOTHMUSCAB\"         Results  Labs   - Hepatic panel: Hepatitis A antibodies reactive   - Liver enzymes: 07/09/2025, AST elevated at 38, ALT elevated at 35, alkaline phosphatase and total bilirubin normal   - A1c: 8.7    Imaging   - CT scan of the abdomen and pelvis: 09/2023, Diffuse diverticulosis, mild edematous wall thickening of the distal antral pyloric region of the stomach, liver cyst noted      Past Medical History       Past Medical History:   Diagnosis Date    Diabetes mellitus        Past Surgical History:   Procedure Laterality Date    ADENOIDECTOMY      TONSILLECTOMY           Current Outpatient Medications:     albuterol sulfate  (90 Base) MCG/ACT inhaler, , Disp: , Rfl:     atorvastatin (LIPITOR) 80 MG tablet, Take 1 tablet by mouth Daily. (Patient taking differently: Take 0.5 tablets by mouth Daily.), Disp: , Rfl:     Blood Glucose Monitoring Suppl (OneTouch Verio Reflect) w/Device kit, , Disp: , Rfl:     ezetimibe (ZETIA) 10 MG tablet, , Disp: , Rfl:     ferrous gluconate (FERGON) 324 MG tablet, Take 1 tablet by mouth Daily With Breakfast., Disp: 90 tablet, Rfl: 1    glucagon (GlucaGen HypoKit) 1 MG injection, , Disp: , Rfl:     glucose blood test strip, OneTouch Verio test strips, Disp: , Rfl:     Insulin Lispro (HumaLOG) 100 UNIT/ML solution cartridge, Humalog 100 unit/mL subcutaneous cartridge inject by subcutaneous route per prescriber's instructions. Insulin dosing requires individualization.   Active, Disp: , Rfl:     Lancets (OneTouch Delica Plus [id number]) misc, , Disp: , Rfl:     Lantus SoloStar 100 UNIT/ML injection pen, , Disp: , Rfl:     Levemir FlexPen 100 UNIT/ML injection, 22u am and 32u pm, Disp: , Rfl:     levothyroxine " "(SYNTHROID, LEVOTHROID) 100 MCG tablet, Take 1 tablet by mouth Daily., Disp: , Rfl:     metFORMIN (GLUCOPHAGE) 500 MG tablet, Take 1 tablet by mouth 2 (Two) Times a Day., Disp: , Rfl:     Olumiant 4 MG tablet, , Disp: , Rfl:     OneTouch Verio test strip, , Disp: , Rfl:     PEG-KCl-NaCl-NaSulf-Na Asc-C (MoviPrep) 100 g reconstituted solution powder, Take 1,000 mL by mouth Every 12 (Twelve) Hours., Disp: 2000 mL, Rfl: 0     Allergies   Allergen Reactions    Adhesive Tape Hives    Codeine Unknown - High Severity and Other (See Comments)    Latex Hives    Metformin Unknown - Low Severity     Caused infection in her pancreas    Penicillins Unknown - High Severity and Rash       Family History   Problem Relation Age of Onset    Breast cancer Maternal Grandmother     Colon cancer Neg Hx         Social History     Social History Narrative    Not on file       Objective       Objective     Vital Signs:   /62 (BP Location: Left arm, Patient Position: Sitting, Cuff Size: Adult)   Pulse 69   Ht 170 cm (66.93\")   Wt 98.9 kg (218 lb)   BMI 34.22 kg/m²     Body mass index is 34.22 kg/m².    Physical Exam  Constitutional:       General: She is not in acute distress.     Appearance: She is well-developed. She is not ill-appearing.   HENT:      Head: Normocephalic.   Eyes:      Pupils: Pupils are equal, round, and reactive to light.   Cardiovascular:      Rate and Rhythm: Normal rate and regular rhythm.      Heart sounds: Normal heart sounds.   Pulmonary:      Effort: Pulmonary effort is normal.      Breath sounds: Normal breath sounds.   Abdominal:      General: Bowel sounds are normal. There is no distension.      Palpations: Abdomen is soft. There is no mass.      Tenderness: There is no abdominal tenderness. There is no guarding or rebound.      Hernia: No hernia is present.   Musculoskeletal:         General: Normal range of motion.   Skin:     General: Skin is warm and dry.   Neurological:      Mental Status: She is " alert and oriented to person, place, and time.   Psychiatric:         Speech: Speech normal.         Behavior: Behavior normal.         Judgment: Judgment normal.         Physical Exam             Assessment & Plan          Assessment and Plan    Diagnoses and all orders for this visit:    1. Elevated liver enzymes (Primary)  -     US Liver; Future  -     Alpha - 1 - Antitrypsin  -     PRICILA  -     Anti-Smooth Muscle Antibody Titer  -     Ceruloplasmin  -     Ferritin  -     Immunofixation, Serum  -     Iron Profile w/o Ferritin  -     Mitochondrial Antibodies, M2  -     Comprehensive Metabolic Panel  -     GUEVARA Fibrosure    2. Viral hepatitis A without hepatic coma  -     US Liver; Future  -     Alpha - 1 - Antitrypsin  -     PRICILA  -     Anti-Smooth Muscle Antibody Titer  -     Ceruloplasmin  -     Ferritin  -     Immunofixation, Serum  -     Iron Profile w/o Ferritin  -     Mitochondrial Antibodies, M2  -     Comprehensive Metabolic Panel  -     GUEVARA Fibrosure    3. Peptic ulcer disease  -     Case Request; Standing  -     Follow Anesthesia Guidelines / Protocol; Future  -     Case Request  -     PEG-KCl-NaCl-NaSulf-Na Asc-C (MoviPrep) 100 g reconstituted solution powder; Take 1,000 mL by mouth Every 12 (Twelve) Hours.  Dispense: 2000 mL; Refill: 0    4. Helicobacter pylori infection  -     Case Request; Standing  -     Follow Anesthesia Guidelines / Protocol; Future  -     Case Request  -     PEG-KCl-NaCl-NaSulf-Na Asc-C (MoviPrep) 100 g reconstituted solution powder; Take 1,000 mL by mouth Every 12 (Twelve) Hours.  Dispense: 2000 mL; Refill: 0    5. Diverticulosis  -     Case Request; Standing  -     Follow Anesthesia Guidelines / Protocol; Future  -     Case Request  -     PEG-KCl-NaCl-NaSulf-Na Asc-C (MoviPrep) 100 g reconstituted solution powder; Take 1,000 mL by mouth Every 12 (Twelve) Hours.  Dispense: 2000 mL; Refill: 0    6. Encounter for screening for malignant neoplasm of colon  -     Case Request;  Standing  -     Follow Anesthesia Guidelines / Protocol; Future  -     Case Request  -     PEG-KCl-NaCl-NaSulf-Na Asc-C (MoviPrep) 100 g reconstituted solution powder; Take 1,000 mL by mouth Every 12 (Twelve) Hours.  Dispense: 2000 mL; Refill: 0    7. Abnormal finding of blood chemistry, unspecified  -     GUEVARA Fibrosure    8. Other specified metabolic disorders  -     GUEVARA Fibrosure    Other orders  -     Follow Anesthesia Guidelines / Protocol; Standing  -     Verify NPO; Standing  -     Verify Bowel Prep Was Successful; Standing  -     Give Tap Water Enema If Bowel Prep Insufficient; Standing  -     Obtain Informed Consent; Standing      Surgical Risk and Benefits discussed: Possible risks/complications, benefits, and alternatives to surgical or invasive procedure have been explained to patient and/or legal guardian; risks include bleeding, infection, and perforation. Patient has been evaluated and can tolerate anesthesia and/or sedation. Risks, benefits, and alternatives to anesthesia and sedation have been explained to patient and/or legal guardian.  Assessment & Plan  1. Elevated liver enzymes:  - Fasting blood work to investigate the cause of elevated liver enzymes.  - Schedule a liver ultrasound.  - Monitor for potential fatty liver disease.    2. Type 1 diabetes:  - Discuss starting Ozempic or Wegovy with healthcare provider during next visit.  - Aim to reduce A1c level from 8.7 to <7.    3. Bowel irregularities:  - Start nightly Senokot to regulate bowel movements.  - Monitor bowel habits and effectiveness of Senokot.    4. Health maintenance:  - Schedule upper and lower endoscopy with Dr. Winston to check for abnormalities, including polyps and diverticulosis.              Follow Up       Follow Up   Return for F/U AFTER PROCEDURE.  Patient was given instructions and counseling regarding her condition or for health maintenance advice. Please see specific information pulled into the AVS if appropriate.        Patient or patient representative verbalized consent for the use of Ambient Listening during the visit with  RYANN David for chart documentation. 7/31/2025  09:45 EDT

## 2025-08-01 ENCOUNTER — PATIENT ROUNDING (BHMG ONLY) (OUTPATIENT)
Dept: GASTROENTEROLOGY | Facility: CLINIC | Age: 65
End: 2025-08-01
Payer: MEDICARE

## 2025-08-01 ENCOUNTER — TELEPHONE (OUTPATIENT)
Dept: GASTROENTEROLOGY | Facility: CLINIC | Age: 65
End: 2025-08-01
Payer: MEDICARE

## 2025-08-01 DIAGNOSIS — A04.8 HELICOBACTER PYLORI INFECTION: ICD-10-CM

## 2025-08-01 DIAGNOSIS — K27.9 PEPTIC ULCER DISEASE: ICD-10-CM

## 2025-08-01 DIAGNOSIS — Z12.11 ENCOUNTER FOR SCREENING FOR MALIGNANT NEOPLASM OF COLON: ICD-10-CM

## 2025-08-01 DIAGNOSIS — K57.90 DIVERTICULOSIS: ICD-10-CM

## 2025-08-01 RX ORDER — PEG-3350, SODIUM SULFATE, SODIUM CHLORIDE, POTASSIUM CHLORIDE, SODIUM ASCORBATE AND ASCORBIC ACID 7.5-2.691G
1000 KIT ORAL EVERY 12 HOURS
Qty: 2000 ML | Refills: 0 | Status: SHIPPED | OUTPATIENT
Start: 2025-08-01 | End: 2025-08-02

## 2025-08-01 RX ORDER — PEG-3350, SODIUM SULFATE, SODIUM CHLORIDE, POTASSIUM CHLORIDE, SODIUM ASCORBATE AND ASCORBIC ACID 7.5-2.691G
1000 KIT ORAL EVERY 12 HOURS
Qty: 2000 ML | Refills: 0 | Status: SHIPPED | OUTPATIENT
Start: 2025-08-01

## 2025-08-01 NOTE — TELEPHONE ENCOUNTER
Patient called the [place] office to ask if we could send prep to aleisha in [facility]. Movieprep has been sent to [facility] pharmacy. Patient aware.

## 2025-08-01 NOTE — PROGRESS NOTES
A My-Chart message has been sent to the patient for PATIENT ROUNDING with [facility].

## 2025-08-07 ENCOUNTER — HOSPITAL ENCOUNTER (OUTPATIENT)
Dept: ULTRASOUND IMAGING | Facility: HOSPITAL | Age: 65
Discharge: HOME | End: 2025-08-07
Payer: MEDICARE

## 2025-08-07 DIAGNOSIS — R74.8 ELEVATED LIVER ENZYMES: ICD-10-CM

## 2025-08-07 DIAGNOSIS — B15.9 VIRAL HEPATITIS A WITHOUT HEPATIC COMA: ICD-10-CM

## 2025-08-08 ENCOUNTER — TELEPHONE (OUTPATIENT)
Dept: GASTROENTEROLOGY | Facility: CLINIC | Age: 65
End: 2025-08-08
Payer: MEDICARE

## 2025-08-08 ENCOUNTER — LAB (OUTPATIENT)
Dept: LAB | Facility: HOSPITAL | Age: 65
End: 2025-08-08
Payer: MEDICARE

## 2025-08-08 ENCOUNTER — HOSPITAL ENCOUNTER (OUTPATIENT)
Dept: ULTRASOUND IMAGING | Facility: HOSPITAL | Age: 65
Discharge: HOME | End: 2025-08-08
Payer: MEDICARE

## 2025-08-08 LAB
ALBUMIN SERPL-MCNC: 3.9 G/DL (ref 3.5–5.2)
ALBUMIN/GLOB SERPL: 1.2 G/DL
ALP SERPL-CCNC: 112 U/L (ref 39–117)
ALPHA1 GLOB MFR UR ELPH: 157 MG/DL (ref 90–200)
ALT SERPL W P-5'-P-CCNC: 129 U/L (ref 1–33)
ANION GAP SERPL CALCULATED.3IONS-SCNC: 9.9 MMOL/L (ref 5–15)
AST SERPL-CCNC: 125 U/L (ref 1–32)
BILIRUB SERPL-MCNC: 0.3 MG/DL (ref 0–1.2)
BUN SERPL-MCNC: 12 MG/DL (ref 8–23)
BUN/CREAT SERPL: 12.8 (ref 7–25)
CALCIUM SPEC-SCNC: 9.7 MG/DL (ref 8.6–10.5)
CERULOPLASMIN SERPL-MCNC: 27 MG/DL (ref 19–39)
CHLORIDE SERPL-SCNC: 106 MMOL/L (ref 98–107)
CO2 SERPL-SCNC: 23.1 MMOL/L (ref 22–29)
CREAT SERPL-MCNC: 0.94 MG/DL (ref 0.57–1)
EGFRCR SERPLBLD CKD-EPI 2021: 67.1 ML/MIN/1.73
FERRITIN SERPL-MCNC: 86.1 NG/ML (ref 13–150)
GLOBULIN UR ELPH-MCNC: 3.2 GM/DL
GLUCOSE SERPL-MCNC: 162 MG/DL (ref 65–99)
IRON 24H UR-MRATE: 177 MCG/DL (ref 37–145)
IRON SATN MFR SERPL: 39 % (ref 20–50)
POTASSIUM SERPL-SCNC: 4.6 MMOL/L (ref 3.5–5.2)
PROT SERPL-MCNC: 7.1 G/DL (ref 6–8.5)
SODIUM SERPL-SCNC: 139 MMOL/L (ref 136–145)
TIBC SERPL-MCNC: 457 MCG/DL (ref 298–536)
TRANSFERRIN SERPL-MCNC: 307 MG/DL (ref 200–360)

## 2025-08-08 PROCEDURE — 82103 ALPHA-1-ANTITRYPSIN TOTAL: CPT | Performed by: NURSE PRACTITIONER

## 2025-08-08 PROCEDURE — 82728 ASSAY OF FERRITIN: CPT | Performed by: NURSE PRACTITIONER

## 2025-08-08 PROCEDURE — 82977 ASSAY OF GGT: CPT | Performed by: NURSE PRACTITIONER

## 2025-08-08 PROCEDURE — 76705 ECHO EXAM OF ABDOMEN: CPT

## 2025-08-08 PROCEDURE — 82465 ASSAY BLD/SERUM CHOLESTEROL: CPT | Performed by: NURSE PRACTITIONER

## 2025-08-08 PROCEDURE — 80053 COMPREHEN METABOLIC PANEL: CPT | Performed by: NURSE PRACTITIONER

## 2025-08-08 PROCEDURE — 82784 ASSAY IGA/IGD/IGG/IGM EACH: CPT | Performed by: NURSE PRACTITIONER

## 2025-08-08 PROCEDURE — 83883 ASSAY NEPHELOMETRY NOT SPEC: CPT | Performed by: NURSE PRACTITIONER

## 2025-08-08 PROCEDURE — 86038 ANTINUCLEAR ANTIBODIES: CPT | Performed by: NURSE PRACTITIONER

## 2025-08-08 PROCEDURE — 86334 IMMUNOFIX E-PHORESIS SERUM: CPT | Performed by: NURSE PRACTITIONER

## 2025-08-08 PROCEDURE — 83010 ASSAY OF HAPTOGLOBIN QUANT: CPT | Performed by: NURSE PRACTITIONER

## 2025-08-08 PROCEDURE — 84466 ASSAY OF TRANSFERRIN: CPT | Performed by: NURSE PRACTITIONER

## 2025-08-08 PROCEDURE — 86015 ACTIN ANTIBODY EACH: CPT | Performed by: NURSE PRACTITIONER

## 2025-08-08 PROCEDURE — 84478 ASSAY OF TRIGLYCERIDES: CPT | Performed by: NURSE PRACTITIONER

## 2025-08-08 PROCEDURE — 82390 ASSAY OF CERULOPLASMIN: CPT | Performed by: NURSE PRACTITIONER

## 2025-08-08 PROCEDURE — 83540 ASSAY OF IRON: CPT | Performed by: NURSE PRACTITIONER

## 2025-08-08 PROCEDURE — 86381 MITOCHONDRIAL ANTIBODY EACH: CPT | Performed by: NURSE PRACTITIONER

## 2025-08-08 PROCEDURE — 82172 ASSAY OF APOLIPOPROTEIN: CPT | Performed by: NURSE PRACTITIONER

## 2025-08-08 PROCEDURE — 86225 DNA ANTIBODY NATIVE: CPT | Performed by: NURSE PRACTITIONER

## 2025-08-09 LAB
MITOCHONDRIA M2 IGG SER-ACNC: <20 UNITS (ref 0–20)
SMA IGG SER-ACNC: 96 UNITS (ref 0–19)

## 2025-08-11 ENCOUNTER — TELEPHONE (OUTPATIENT)
Dept: GASTROENTEROLOGY | Facility: CLINIC | Age: 65
End: 2025-08-11
Payer: MEDICARE

## 2025-08-11 DIAGNOSIS — Z12.11 ENCOUNTER FOR SCREENING FOR MALIGNANT NEOPLASM OF COLON: Primary | ICD-10-CM

## 2025-08-11 LAB
ANA SER QL: POSITIVE
DSDNA AB SER-ACNC: 7 IU/ML (ref 0–9)
Lab: NORMAL

## 2025-08-12 ENCOUNTER — ANESTHESIA EVENT (OUTPATIENT)
Dept: GASTROENTEROLOGY | Facility: HOSPITAL | Age: 65
End: 2025-08-12
Payer: MEDICARE

## 2025-08-12 ENCOUNTER — RESULTS FOLLOW-UP (OUTPATIENT)
Dept: GASTROENTEROLOGY | Facility: CLINIC | Age: 65
End: 2025-08-12
Payer: MEDICARE

## 2025-08-12 LAB
A2 MACROGLOB SERPL-MCNC: 273 MG/DL (ref 110–276)
ALT SERPL W P-5'-P-CCNC: 158 IU/L (ref 0–40)
APO A-I SERPL-MCNC: 190 MG/DL (ref 116–209)
AST SERPL W P-5'-P-CCNC: 138 IU/L (ref 0–40)
BILIRUB SERPL-MCNC: <0.2 MG/DL (ref 0–1.2)
CHOLEST SERPL-MCNC: 188 MG/DL (ref 100–199)
FIBROSIS SCORING:: ABNORMAL
FIBROSIS STAGE SERPL QL: ABNORMAL
GGT SERPL-CCNC: 36 IU/L (ref 0–60)
GLUCOSE SERPL-MCNC: 172 MG/DL (ref 70–99)
HAPTOGLOB SERPL-MCNC: 198 MG/DL (ref 37–355)
IGA SERPL-MCNC: 279 MG/DL (ref 87–352)
IGG SERPL-MCNC: 1027 MG/DL (ref 586–1602)
IGM SERPL-MCNC: 66 MG/DL (ref 26–217)
LABORATORY COMMENT REPORT: ABNORMAL
LIVER FIBR SCORE SERPL CALC.FIBROSURE: 0.13 (ref 0–0.21)
LIVER STEATOSIS GRADE SERPL QL: ABNORMAL
LIVER STEATOSIS SCORE SERPL: 0.76 (ref 0–0.4)
NASH GRADE SERPL QL: ABNORMAL
NASH INTERPRETATION SERPL-IMP: ABNORMAL
NASH SCORE SERPL: 0.92 (ref 0–0.25)
NASH SCORING: ABNORMAL
PROT PATTERN SERPL IFE-IMP: NORMAL
STEATOSIS SCORING: ABNORMAL
TEST PERFORMANCE INFO SPEC: ABNORMAL
TEST PERFORMANCE INFO SPEC: ABNORMAL
TRIGL SERPL-MCNC: 137 MG/DL (ref 0–149)

## 2025-08-13 ENCOUNTER — ANESTHESIA (OUTPATIENT)
Dept: GASTROENTEROLOGY | Facility: HOSPITAL | Age: 65
End: 2025-08-13
Payer: MEDICARE

## 2025-08-13 ENCOUNTER — HOSPITAL ENCOUNTER (OUTPATIENT)
Facility: HOSPITAL | Age: 65
Setting detail: HOSPITAL OUTPATIENT SURGERY
Discharge: HOME | End: 2025-08-13
Attending: INTERNAL MEDICINE | Admitting: INTERNAL MEDICINE
Payer: MEDICARE

## 2025-08-13 VITALS
TEMPERATURE: 97.1 F | WEIGHT: 213.19 LBS | BODY MASS INDEX: 33.46 KG/M2 | RESPIRATION RATE: 16 BRPM | HEART RATE: 65 BPM | DIASTOLIC BLOOD PRESSURE: 61 MMHG | SYSTOLIC BLOOD PRESSURE: 115 MMHG | OXYGEN SATURATION: 96 %

## 2025-08-13 DIAGNOSIS — Z12.11 ENCOUNTER FOR SCREENING FOR MALIGNANT NEOPLASM OF COLON: ICD-10-CM

## 2025-08-13 DIAGNOSIS — K27.9 PEPTIC ULCER DISEASE: ICD-10-CM

## 2025-08-13 DIAGNOSIS — K57.90 DIVERTICULOSIS: ICD-10-CM

## 2025-08-13 DIAGNOSIS — A04.8 HELICOBACTER PYLORI INFECTION: ICD-10-CM

## 2025-08-13 LAB — GLUCOSE BLDC GLUCOMTR-MCNC: 141 MG/DL (ref 70–99)

## 2025-08-13 PROCEDURE — 25010000002 PROPOFOL 10 MG/ML EMULSION: Performed by: NURSE ANESTHETIST, CERTIFIED REGISTERED

## 2025-08-13 PROCEDURE — 25810000003 LACTATED RINGERS PER 1000 ML: Performed by: NURSE ANESTHETIST, CERTIFIED REGISTERED

## 2025-08-13 PROCEDURE — 88305 TISSUE EXAM BY PATHOLOGIST: CPT | Performed by: INTERNAL MEDICINE

## 2025-08-13 PROCEDURE — 82948 REAGENT STRIP/BLOOD GLUCOSE: CPT | Performed by: NURSE ANESTHETIST, CERTIFIED REGISTERED

## 2025-08-13 PROCEDURE — G0121 COLON CA SCRN NOT HI RSK IND: HCPCS | Performed by: INTERNAL MEDICINE

## 2025-08-13 PROCEDURE — 88342 IMHCHEM/IMCYTCHM 1ST ANTB: CPT | Performed by: INTERNAL MEDICINE

## 2025-08-13 PROCEDURE — 43239 EGD BIOPSY SINGLE/MULTIPLE: CPT | Performed by: INTERNAL MEDICINE

## 2025-08-13 PROCEDURE — 25010000002 LIDOCAINE PF 2% 2 % SOLUTION: Performed by: NURSE ANESTHETIST, CERTIFIED REGISTERED

## 2025-08-13 RX ORDER — PROPOFOL 10 MG/ML
VIAL (ML) INTRAVENOUS AS NEEDED
Status: DISCONTINUED | OUTPATIENT
Start: 2025-08-13 | End: 2025-08-13 | Stop reason: SURG

## 2025-08-13 RX ORDER — LIDOCAINE HYDROCHLORIDE 20 MG/ML
INJECTION, SOLUTION EPIDURAL; INFILTRATION; INTRACAUDAL; PERINEURAL AS NEEDED
Status: DISCONTINUED | OUTPATIENT
Start: 2025-08-13 | End: 2025-08-13 | Stop reason: SURG

## 2025-08-13 RX ORDER — SODIUM CHLORIDE, SODIUM LACTATE, POTASSIUM CHLORIDE, CALCIUM CHLORIDE 600; 310; 30; 20 MG/100ML; MG/100ML; MG/100ML; MG/100ML
30 INJECTION, SOLUTION INTRAVENOUS CONTINUOUS
Status: DISCONTINUED | OUTPATIENT
Start: 2025-08-13 | End: 2025-08-13 | Stop reason: HOSPADM

## 2025-08-13 RX ADMIN — LIDOCAINE HYDROCHLORIDE 40 MG: 20 INJECTION, SOLUTION EPIDURAL; INFILTRATION; INTRACAUDAL; PERINEURAL at 10:11

## 2025-08-13 RX ADMIN — PROPOFOL 250 MCG/KG/MIN: 10 INJECTION, EMULSION INTRAVENOUS at 10:11

## 2025-08-13 RX ADMIN — PROPOFOL 50 MG: 10 INJECTION, EMULSION INTRAVENOUS at 10:11

## 2025-08-13 RX ADMIN — SODIUM CHLORIDE, POTASSIUM CHLORIDE, SODIUM LACTATE AND CALCIUM CHLORIDE 30 ML/HR: 600; 310; 30; 20 INJECTION, SOLUTION INTRAVENOUS at 09:16

## 2025-08-14 ENCOUNTER — TELEPHONE (OUTPATIENT)
Dept: GASTROENTEROLOGY | Facility: CLINIC | Age: 65
End: 2025-08-14
Payer: MEDICARE

## 2025-08-14 ENCOUNTER — OFFICE VISIT (OUTPATIENT)
Dept: ORTHOPEDIC SURGERY | Facility: CLINIC | Age: 65
End: 2025-08-14
Payer: MEDICARE

## 2025-08-14 VITALS
SYSTOLIC BLOOD PRESSURE: 119 MMHG | BODY MASS INDEX: 33.43 KG/M2 | HEART RATE: 78 BPM | WEIGHT: 213 LBS | OXYGEN SATURATION: 95 % | DIASTOLIC BLOOD PRESSURE: 75 MMHG | HEIGHT: 67 IN

## 2025-08-14 DIAGNOSIS — M17.12 OSTEOARTHRITIS OF LEFT KNEE, UNSPECIFIED OSTEOARTHRITIS TYPE: ICD-10-CM

## 2025-08-14 DIAGNOSIS — S80.02XA CONTUSION OF LEFT KNEE, INITIAL ENCOUNTER: Primary | ICD-10-CM

## 2025-08-14 LAB
CYTO UR: NORMAL
LAB AP CASE REPORT: NORMAL
LAB AP CLINICAL INFORMATION: NORMAL
LAB AP SPECIAL STAINS: NORMAL
PATH REPORT.FINAL DX SPEC: NORMAL
PATH REPORT.GROSS SPEC: NORMAL

## (undated) DEVICE — LINER SURG CANSTR SXN S/RIGD 1500CC

## (undated) DEVICE — SOL IRRG H2O PL/BG 1000ML STRL

## (undated) DEVICE — CONN JET HYDRA H20 AUXILIARY DISP

## (undated) DEVICE — SINGLE-USE BIOPSY FORCEPS: Brand: RADIAL JAW 4

## (undated) DEVICE — Device

## (undated) DEVICE — BLCK/BITE BLOX WO/DENTL/RIM W/STRAP 54F

## (undated) DEVICE — DEFENDO AIR WATER SUCTION AND BIOPSY VALVE KIT: Brand: DEFENDO AIR/WATER/SUCTION AND BIOPSY VALVE

## (undated) DEVICE — SOLIDIFIER LIQLOC PLS 1500CC BT